# Patient Record
Sex: FEMALE | Race: BLACK OR AFRICAN AMERICAN | Employment: UNEMPLOYED | URBAN - METROPOLITAN AREA
[De-identification: names, ages, dates, MRNs, and addresses within clinical notes are randomized per-mention and may not be internally consistent; named-entity substitution may affect disease eponyms.]

---

## 2017-02-07 ENCOUNTER — TELEPHONE (OUTPATIENT)
Dept: FAMILY MEDICINE CLINIC | Age: 57
End: 2017-02-07

## 2017-02-20 ENCOUNTER — OFFICE VISIT (OUTPATIENT)
Dept: FAMILY MEDICINE CLINIC | Age: 57
End: 2017-02-20

## 2017-02-20 VITALS
OXYGEN SATURATION: 98 % | BODY MASS INDEX: 40.92 KG/M2 | TEMPERATURE: 97.6 F | DIASTOLIC BLOOD PRESSURE: 74 MMHG | SYSTOLIC BLOOD PRESSURE: 108 MMHG | HEIGHT: 68 IN | RESPIRATION RATE: 16 BRPM | HEART RATE: 72 BPM | WEIGHT: 270 LBS

## 2017-02-20 DIAGNOSIS — R35.0 FREQUENT URINATION: Primary | ICD-10-CM

## 2017-02-20 DIAGNOSIS — Z87.442 H/O RENAL CALCULI: ICD-10-CM

## 2017-02-20 DIAGNOSIS — R11.0 NAUSEA: ICD-10-CM

## 2017-02-20 DIAGNOSIS — R42 VERTIGO: ICD-10-CM

## 2017-02-20 DIAGNOSIS — R31.9 HEMATURIA: ICD-10-CM

## 2017-02-20 LAB
BILIRUB UR QL STRIP: NEGATIVE
GLUCOSE UR-MCNC: NEGATIVE MG/DL
KETONES P FAST UR STRIP-MCNC: NORMAL MG/DL
PH UR STRIP: 5.5 [PH] (ref 4.6–8)
PROT UR QL STRIP: NEGATIVE MG/DL
SP GR UR STRIP: 1.03 (ref 1–1.03)
UA UROBILINOGEN AMB POC: NORMAL (ref 0.2–1)
URINALYSIS CLARITY POC: CLEAR
URINALYSIS COLOR POC: YELLOW
URINE BLOOD POC: NORMAL
URINE LEUKOCYTES POC: NEGATIVE
URINE NITRITES POC: NEGATIVE

## 2017-02-20 RX ORDER — MECLIZINE HYDROCHLORIDE 25 MG/1
25 TABLET ORAL
Qty: 30 TAB | Refills: 2 | Status: SHIPPED | OUTPATIENT
Start: 2017-02-20 | End: 2017-03-02

## 2017-02-20 NOTE — PROGRESS NOTES
1. Have you been to the ER, urgent care clinic since your last visit? Hospitalized since your last visit? No    2. Have you seen or consulted any other health care providers outside of the [address] [place] [address] since your last visit? Include any pap smears or colon screening. No     Patient states she needs amitriptyline. She's taking tramadol- she has pain in back and hip. She states shes having frequent urination.

## 2017-02-20 NOTE — PROGRESS NOTES
Evan Sanchez is a 55 y.o.  female and presents with     Chief Complaint   Patient presents with    Nausea    Dizziness    Back Pain       Pt says she has nausea. Pt has back pain. Pt gives h/o kidney stones in the past.Pt has vertigo. Pt says she is falling apart. Pt takes metformin for obeisty and tramadol for back pain. Past Medical History   Diagnosis Date    Arthritis     Bipolar 1 disorder ([address]     Carpal tunnel syndrome      bother arms    Fibromyalgia     GERD (gastroesophageal reflux disease)     Hypercholesterolemia     IBS (irritable bowel syndrome)     Slipped cervical disc      Past Surgical History   Procedure Laterality Date    Hx cyst removal       Left wrist    Hx tubal ligation      Hx hysterectomy       uterus prolapse     Current Outpatient Prescriptions   Medication Sig    meclizine (ANTIVERT) 25 mg tablet Take 1 Tab by mouth three (3) times daily as needed for up to 10 days.  traMADol (ULTRAM) 50 mg tablet Take 1 Tab by mouth nightly as needed for Pain. Max Daily Amount: 50 mg.    traMADol (ULTRAM) 50 mg tablet Take 1 Tab by mouth every six (6) hours as needed for Pain. Max Daily Amount: 200 mg.    lubiPROStone (AMITIZA) 8 mcg capsule Take 1 Cap by mouth two (2) times daily (with meals).  lactulose (CHRONULAC) 10 gram/15 mL solution Take 15 mL by mouth nightly.  Omeprazole delayed release (PRILOSEC D/R) 20 mg tablet Take 1 Tab by mouth two (2) times a day.  omeprazole (PRILOSEC) 20 mg capsule Take 1 Cap by mouth two (2) times a day.  polyethylene glycol (MIRALAX) 17 gram/dose powder Take 17 g by mouth daily.  metFORMIN (GLUCOPHAGE) 500 mg tablet Take 1 Tab by mouth two (2) times daily (with meals). No current facility-administered medications for this visit.       Health Maintenance   Topic Date Due    BREAST CANCER SCRN MAMMOGRAM  12/16/2017    COLONOSCOPY  09/03/2018    PAP AKA CERVICAL CYTOLOGY  10/28/2019    DTaP/Tdap/Td series (2 - Td) 10/28/2026    Hepatitis C Screening  Completed    INFLUENZA AGE 9 TO ADULT  Completed     Immunization History   Administered Date(s) Administered    Influenza Vaccine (Quad) PF 10/28/2016    Tdap 10/28/2016     No LMP recorded. Patient has had a hysterectomy. Allergies and Intolerances: Allergies   Allergen Reactions    Biaxin [Clarithromycin] Rash    Codeine Itching    Morphine Shortness of Breath and Itching     burning    Nubain [Nalbuphine] Itching       Family History:   Family History   Problem Relation Age of Onset    Alcohol abuse Father        Social History:   She  reports that she has never smoked. She has never used smokeless tobacco.  She  reports that she does not drink alcohol.             Review of Systems: pos for nsytagmus  General: negative for - chills, fatigue, fever, weight change  Psych: negative for - anxiety, depression, irritability or mood swings  ENT: negative for - headaches, hearing change, nasal congestion, oral lesions, sneezing or sore throat  Heme/ Lymph: negative for - bleeding problems, bruising, pallor or swollen lymph nodes  Endo: negative for - hot flashes, polydipsia/polyuria or temperature intolerance  Resp: negative for - cough, shortness of breath or wheezing  CV: negative for - chest pain, edema or palpitations  GI: negative for - abdominal pain, change in bowel habits, constipation, diarrhea or nausea/vomiting,pos for nausea  : negative for - dysuria, hematuria, incontinence, pelvic pain or vulvar/vaginal symptoms  MSK: negative for - joint pain, joint swelling or muscle pain  Neuro: negative for - confusion, headaches, seizures or weakness  Derm: negative for - dry skin, hair changes, rash or skin lesion changes          Physical:   Vitals:   Vitals:    02/20/17 1148   BP: 108/74   Pulse: 72   Resp: 16   Temp: 97.6 °F (36.4 °C)   TempSrc: Oral   SpO2: 98%   Weight: 270 lb (122.5 kg)   Height: 5' 8\" (1.727 m)           Exam:   HEENT- atraumatic,normocephalic, awake, oriented, well nourished, nystagmus ++ horizontal  Neck - supple,no enlarged lymph nodes, no JVD, no thyromegaly  Chest- CTA, no rhonchi, no crackles  Heart- rrr, no murmurs / gallop/rub  Abdomen- soft,BS+,NT, no hepatosplenomegaly  Ext - no c/c/edema   Neuro- no focal deficits. Power 5/5 all extremities  Skin - warm,dry, no obvious rashes. Review of Data:   LABS:   Lab Results   Component Value Date/Time    WBC 8.9 12/06/2016 12:00 AM    HGB 12.1 12/06/2016 12:00 AM    HCT 35.9 12/06/2016 12:00 AM    PLATELET 052 96/91/1429 12:00 AM     Lab Results   Component Value Date/Time    Sodium 142 12/06/2016 12:00 AM    Potassium 4.2 12/06/2016 12:00 AM    Chloride 101 12/06/2016 12:00 AM    CO2 24 12/06/2016 12:00 AM    Glucose 106 12/06/2016 12:00 AM    BUN 12 12/06/2016 12:00 AM    Creatinine 0.93 12/06/2016 12:00 AM     Lab Results   Component Value Date/Time    Cholesterol, total 203 10/28/2016 11:42 AM    HDL Cholesterol 52 10/28/2016 11:42 AM    LDL, calculated 123 10/28/2016 11:42 AM    Triglyceride 140 10/28/2016 11:42 AM     No results found for: GPT        Impression / Plan:        ICD-10-CM ICD-9-CM    1. Frequent urination R35.0 788.41 AMB POC URINALYSIS DIP STICK AUTO W/O MICRO   2. Vertigo R42 780.4 meclizine (ANTIVERT) 25 mg tablet      REFERRAL TO ENT-OTOLARYNGOLOGY   3. Nausea R11.0 787.02 REFERRAL TO GASTROENTEROLOGY   4. Hematuria R31.9 599.70 CT ABD PELV W WO CONT   5. H/O renal calculi Z87.442 V13.01 CT ABD PELV W WO CONT     Asked pt to hold metformin . Take tramadol with food. Explained to patient risk benefits of the medications. Advised patient to stop meds if having any side effects. Pt verbalized understanding of the instructions. I have discussed the diagnosis with the patient and the intended plan as seen in the above orders. The patient has received an after-visit summary and questions were answered concerning future plans.   I have discussed medication side effects and warnings with the patient as well. I have reviewed the plan of care with the patient, accepted their input and they are in agreement with the treatment goals. Reviewed plan of care. Patient has provided input and agrees with goals.     Follow-up Disposition: Not on Daisy Enrique MD

## 2017-02-20 NOTE — MR AVS SNAPSHOT
Visit Information Date & Time Provider Department Dept. Phone Encounter #  
 2/20/2017 11:30 AM Yecenia Henderson, [address] [facility] [phone] [id number] Follow-up Instructions Return in about 6 weeks (around 4/3/2017). Upcoming Health Maintenance Date Due  
 BREAST CANCER SCRN MAMMOGRAM 12/16/2017 COLONOSCOPY 9/3/2018 PAP AKA CERVICAL CYTOLOGY 10/28/2019 DTaP/Tdap/Td series (2 - Td) 10/28/2026 Allergies as of 2/20/2017  Review Complete On: 2/20/2017 By: Lissy Thompson LPN Severity Noted Reaction Type Reactions Biaxin [Clarithromycin]  02/22/2016    Rash Codeine  07/29/2013    Itching Morphine  07/29/2013    Shortness of Breath, Itching  
 burning Nubain [Nalbuphine]  07/29/2013    Itching Current Immunizations  Never Reviewed Name Date Influenza Vaccine (Quad) PF 10/28/2016 11:20 AM  
 Tdap 10/28/2016 11:20 AM  
  
 Not reviewed this visit You Were Diagnosed With   
  
 Codes Comments Frequent urination    -  Primary ICD-10-CM: R35.0 ICD-9-CM: 788.41 Vertigo     ICD-10-CM: F48 ICD-9-CM: 780.4 Nausea     ICD-10-CM: R11.0 ICD-9-CM: 787.02 Hematuria     ICD-10-CM: R31.9 ICD-9-CM: 599.70 H/O renal calculi     ICD-10-CM: Z87.442 ICD-9-CM: V13.01 Vitals BP Pulse Temp Resp Height(growth percentile) Weight(growth percentile) 108/74 (BP 1 Location: Left arm, BP Patient Position: Sitting) 72 97.6 °F (36.4 °C) (Oral) 16 5' 8\" (1.727 m) 270 lb (122.5 kg) SpO2 BMI OB Status Smoking Status 98% 41.05 kg/m2 Hysterectomy Never Smoker Vitals History BMI and BSA Data Body Mass Index Body Surface Area 41.05 kg/m 2 2.42 m 2 Preferred Pharmacy Pharmacy Name Phone [id number] [address], [address] [place] [address] [phone] Your Updated Medication List  
  
   
This list is accurate as of: 2/20/17 12:51 PM.  Always use your most recent med list.  
  
  
  
  
 lactulose 10 gram/15 mL solution Commonly known as:  [place] Take 15 mL by mouth nightly. lubiPROStone 8 mcg capsule Commonly known as:  [place] Force Take 1 Cap by mouth two (2) times daily (with meals). meclizine 25 mg tablet Commonly known as:  ANTIVERT Take 1 Tab by mouth three (3) times daily as needed for up to 10 days. metFORMIN 500 mg tablet Commonly known as:  GLUCOPHAGE Take 1 Tab by mouth two (2) times daily (with meals). * Omeprazole delayed release 20 mg tablet Commonly known as:  PRILOSEC D/R Take 1 Tab by mouth two (2) times a day. * omeprazole 20 mg capsule Commonly known as:  PriLOSEC Take 1 Cap by mouth two (2) times a day. polyethylene glycol 17 gram/dose powder Commonly known as:  Nat Diane Take 17 g by mouth daily. * traMADol 50 mg tablet Commonly known as:  ULTRAM  
Take 1 Tab by mouth every six (6) hours as needed for Pain. Max Daily Amount: 200 mg.  
  
 * traMADol 50 mg tablet Commonly known as:  ULTRAM  
Take 1 Tab by mouth nightly as needed for Pain. Max Daily Amount: 50 mg.  
  
 * Notice: This list has 4 medication(s) that are the same as other medications prescribed for you. Read the directions carefully, and ask your doctor or other care provider to review them with you. Prescriptions Sent to Pharmacy Refills  
 meclizine (ANTIVERT) 25 mg tablet 2 Sig: Take 1 Tab by mouth three (3) times daily as needed for up to 10 days. Class: Normal  
 [facility] [address] #: [phone] Route: Oral  
  
We Performed the Following AMB POC URINALYSIS DIP STICK AUTO W/O MICRO [85075 CPT(R)] REFERRAL TO ENT-OTOLARYNGOLOGY [NNP90 Custom] REFERRAL TO GASTROENTEROLOGY [ZLW53 Custom] Follow-up Instructions Return in about 6 weeks (around 4/3/2017). To-Do List   
 02/20/2017 Imaging:  CT ABD PELV W WO CONT Referral Information Referral ID Referred By Referred To  
  
 [id number] [facility] [place], Isaura Hernandez., MD [address] Phone: [phone] Fax: [phone] Visits Status Start Date End Date 1 New Request 2/20/17 2/20/18 If your referral has a status of pending review or denied, additional information will be sent to support the outcome of this decision. Referral ID Referred By Referred To  
 [id number] Samina GILES Not Available Visits Status Start Date End Date 1 New Request 2/20/17 2/20/18 If your referral has a status of pending review or denied, additional information will be sent to support the outcome of this decision. Referral ID Referred By Referred To  
 [id number] Samina GILES Not Available Visits Status Start Date End Date 1 New Request 2/20/17 2/20/18 If your referral has a status of pending review or denied, additional information will be sent to support the outcome of this decision. Introducing [facility] [place] [facility]! Zacarias Sanchez introduces [employer] patient portal. Now you can access parts of your medical record, email your doctor's office, and request medication refills online. 1. In your internet browser, go to [email or web address] 2. Click on the First Time User? Click Here link in the Sign In box. You will see the New Member Sign Up page. 3. Enter your [employer] Access Code exactly as it appears below. You will not need to use this code after youve completed the sign-up process. If you do not sign up before the expiration date, you must request a new code. · [employer] Access Code: [email or web address] Expires: 5/9/2017  2:08 PM 
 
4. Enter the last four digits of your Social Security Number (xxxx) and Date of Birth (mm/dd/yyyy) as indicated and click Submit. You will be taken to the next sign-up page. 5. Create a Con[employer] ID. This will be your [employer] login ID and cannot be changed, so think of one that is secure and easy to remember. 6. Create a [employer] password. You can change your password at any time. 7. Enter your Password Reset Question and Answer. This can be used at a later time if you forget your password. 8. Enter your e-mail address. You will receive e-mail notification when new information is available in [address] 9. Click Sign Up. You can now view and download portions of your medical record. 10. Click the Download Summary menu link to download a portable copy of your medical information. If you have questions, please visit the Frequently Asked Questions section of the [employer] website. Remember, [employer] is NOT to be used for urgent needs. For medical emergencies, dial 911. Now available from your iPhone and Android! Please provide this summary of care documentation to your next provider. Your primary care clinician is listed as [phone]. If you have any questions after today's visit, please call [phone].

## 2017-03-10 ENCOUNTER — HOSPITAL ENCOUNTER (OUTPATIENT)
Dept: CT IMAGING | Age: 57
Discharge: HOME | End: 2017-03-10
Attending: INTERNAL MEDICINE
Payer: MEDICAID

## 2017-03-10 DIAGNOSIS — R31.9 HEMATURIA: ICD-10-CM

## 2017-03-10 DIAGNOSIS — Z87.442 H/O RENAL CALCULI: ICD-10-CM

## 2017-03-10 PROCEDURE — 74176 CT ABD & PELVIS W/O CONTRAST: CPT

## 2017-04-06 RX ORDER — LUBIPROSTONE 8 UG/1
8 CAPSULE, GELATIN COATED ORAL 2 TIMES DAILY WITH MEALS
COMMUNITY
End: 2017-10-17

## 2017-04-12 ENCOUNTER — TELEPHONE (OUTPATIENT)
Dept: FAMILY MEDICINE CLINIC | Age: 57
End: 2017-04-12

## 2017-04-12 NOTE — TELEPHONE ENCOUNTER
Pt called wanting to know if she could get her lab results without being seen by Dr. Donato Tabor.  Please assist.

## 2017-04-14 ENCOUNTER — ANESTHESIA EVENT (OUTPATIENT)
Dept: ENDOSCOPY | Age: 57
End: 2017-04-14
Payer: MEDICAID

## 2017-04-14 NOTE — TELEPHONE ENCOUNTER
CTscan does not show zoe kidney stones. Pt had h/o kidney stones in the past , Recent CT scan is normal, so that is good.

## 2017-04-17 ENCOUNTER — ANESTHESIA (OUTPATIENT)
Dept: ENDOSCOPY | Age: 57
End: 2017-04-17
Payer: MEDICAID

## 2017-04-17 ENCOUNTER — HOSPITAL ENCOUNTER (OUTPATIENT)
Age: 57
Setting detail: OUTPATIENT SURGERY
Discharge: HOME | End: 2017-04-17
Attending: INTERNAL MEDICINE | Admitting: INTERNAL MEDICINE
Payer: MEDICAID

## 2017-04-17 VITALS
OXYGEN SATURATION: 99 % | BODY MASS INDEX: 38.19 KG/M2 | SYSTOLIC BLOOD PRESSURE: 113 MMHG | TEMPERATURE: 97.2 F | WEIGHT: 252 LBS | RESPIRATION RATE: 16 BRPM | DIASTOLIC BLOOD PRESSURE: 56 MMHG | HEIGHT: 68 IN | HEART RATE: 85 BPM

## 2017-04-17 PROCEDURE — 74011250636 HC RX REV CODE- 250/636

## 2017-04-17 PROCEDURE — 76040000007: Performed by: INTERNAL MEDICINE

## 2017-04-17 PROCEDURE — 88305 TISSUE EXAM BY PATHOLOGIST: CPT | Performed by: INTERNAL MEDICINE

## 2017-04-17 PROCEDURE — 76060000032 HC ANESTHESIA 0.5 TO 1 HR: Performed by: INTERNAL MEDICINE

## 2017-04-17 PROCEDURE — 88342 IMHCHEM/IMCYTCHM 1ST ANTB: CPT | Performed by: INTERNAL MEDICINE

## 2017-04-17 PROCEDURE — 74011250636 HC RX REV CODE- 250/636: Performed by: NURSE ANESTHETIST, CERTIFIED REGISTERED

## 2017-04-17 RX ORDER — ONDANSETRON 2 MG/ML
INJECTION INTRAMUSCULAR; INTRAVENOUS AS NEEDED
Status: DISCONTINUED | OUTPATIENT
Start: 2017-04-17 | End: 2017-04-17 | Stop reason: HOSPADM

## 2017-04-17 RX ORDER — PROPOFOL 10 MG/ML
INJECTION, EMULSION INTRAVENOUS AS NEEDED
Status: DISCONTINUED | OUTPATIENT
Start: 2017-04-17 | End: 2017-04-17 | Stop reason: HOSPADM

## 2017-04-17 RX ORDER — DEXAMETHASONE SODIUM PHOSPHATE 4 MG/ML
INJECTION, SOLUTION INTRA-ARTICULAR; INTRALESIONAL; INTRAMUSCULAR; INTRAVENOUS; SOFT TISSUE AS NEEDED
Status: DISCONTINUED | OUTPATIENT
Start: 2017-04-17 | End: 2017-04-17 | Stop reason: HOSPADM

## 2017-04-17 RX ORDER — FAMOTIDINE 20 MG/1
20 TABLET, FILM COATED ORAL ONCE
Status: DISCONTINUED | OUTPATIENT
Start: 2017-04-17 | End: 2017-04-17

## 2017-04-17 RX ORDER — SODIUM CHLORIDE 0.9 % (FLUSH) 0.9 %
5-10 SYRINGE (ML) INJECTION EVERY 8 HOURS
Status: DISCONTINUED | OUTPATIENT
Start: 2017-04-17 | End: 2017-04-17 | Stop reason: HOSPADM

## 2017-04-17 RX ORDER — SODIUM CHLORIDE 0.9 % (FLUSH) 0.9 %
5-10 SYRINGE (ML) INJECTION AS NEEDED
Status: DISCONTINUED | OUTPATIENT
Start: 2017-04-17 | End: 2017-04-17 | Stop reason: HOSPADM

## 2017-04-17 RX ORDER — LIDOCAINE HYDROCHLORIDE 10 MG/ML
0.1 INJECTION, SOLUTION EPIDURAL; INFILTRATION; INTRACAUDAL; PERINEURAL AS NEEDED
Status: DISCONTINUED | OUTPATIENT
Start: 2017-04-17 | End: 2017-04-17 | Stop reason: HOSPADM

## 2017-04-17 RX ORDER — DEXTROMETHORPHAN/PSEUDOEPHED 2.5-7.5/.8
1.2 DROPS ORAL
Status: DISCONTINUED | OUTPATIENT
Start: 2017-04-17 | End: 2017-04-17 | Stop reason: HOSPADM

## 2017-04-17 RX ORDER — PROPOFOL 10 MG/ML
INJECTION, EMULSION INTRAVENOUS
Status: DISCONTINUED | OUTPATIENT
Start: 2017-04-17 | End: 2017-04-17 | Stop reason: HOSPADM

## 2017-04-17 RX ORDER — SODIUM CHLORIDE, SODIUM LACTATE, POTASSIUM CHLORIDE, CALCIUM CHLORIDE 600; 310; 30; 20 MG/100ML; MG/100ML; MG/100ML; MG/100ML
25 INJECTION, SOLUTION INTRAVENOUS CONTINUOUS
Status: DISCONTINUED | OUTPATIENT
Start: 2017-04-17 | End: 2017-04-17 | Stop reason: HOSPADM

## 2017-04-17 RX ADMIN — ONDANSETRON 4 MG: 2 INJECTION INTRAMUSCULAR; INTRAVENOUS at 11:28

## 2017-04-17 RX ADMIN — SODIUM CHLORIDE, SODIUM LACTATE, POTASSIUM CHLORIDE, AND CALCIUM CHLORIDE 25 ML/HR: 600; 310; 30; 20 INJECTION, SOLUTION INTRAVENOUS at 11:14

## 2017-04-17 RX ADMIN — DEXAMETHASONE SODIUM PHOSPHATE 4 MG: 4 INJECTION, SOLUTION INTRA-ARTICULAR; INTRALESIONAL; INTRAMUSCULAR; INTRAVENOUS; SOFT TISSUE at 11:28

## 2017-04-17 RX ADMIN — PROPOFOL 100 MG: 10 INJECTION, EMULSION INTRAVENOUS at 11:40

## 2017-04-17 RX ADMIN — PROPOFOL 140 MCG/KG/MIN: 10 INJECTION, EMULSION INTRAVENOUS at 11:40

## 2017-04-17 NOTE — IP AVS SNAPSHOT
Olga Gonzalez 
 
 
 [address] 
[phone] Patient: Katherine Bridges MRN: [id number] You are allergic to the following Allergen Reactions Biaxin (Clarithromycin) Rash Codeine Itching Morphine Shortness of Breath Itching  
 burning Nubain (Nalbuphine) Itching Recent Documentation Height Weight Breastfeeding? BMI OB Status Smoking Status 1.727 m 114.3 kg No 38.32 kg/m2 Hysterectomy Never Smoker Emergency Contacts Name Discharge Info Relation Home Work Mobile Chepe Blade [5]   [phone] Crowder,Lakia DISCHARGE CAREGIVER [3] Child [2] [phone] About your hospitalization You were admitted on:  April 17, 2017 [facility] You were discharged on:  April 17, 2017 Unit phone number:  [phone] Why you were hospitalized Your primary diagnosis was:  Not on File Providers Seen During Your Hospitalizations Provider Role Specialty Primary office phone Marlene Lara MD Attending Provider Gastroenterology [phone] Your Primary Care Physician (PCP) Primary Care Physician Office Phone Office Fax [facility] [phone] Follow-up Information Follow up With Details Comments Contact Info Ozzie Casanova MD   [address] [place] [address] [phone] Your Appointments Thursday April 20, 2017 10:30 AM EDT Follow Up with Ozzie Casanova MD  
[facility] [address] [place] [address] [id number] [place] [address] [address] [place] [address] [phone] Current Discharge Medication List  
  
CONTINUE these medications which have NOT CHANGED Dose & Instructions Dispensing Information Comments Morning Noon Evening Bedtime AMITIZA 8 mcg capsule Generic drug:  lubiPROStone Your last dose was: Your next dose is:    
   
   
 Dose:  8 mcg Take 8 mcg by mouth two (2) times daily (with meals). Refills:  0  
     
   
   
   
  
 metFORMIN 500 mg tablet Commonly known as:  GLUCOPHAGE Your last dose was: Your next dose is:    
   
   
 Dose:  500 mg Take 1 Tab by mouth two (2) times daily (with meals). Quantity:  60 Tab Refills:  3  
     
   
   
   
  
 omeprazole 20 mg capsule Commonly known as:  PriLOSEC Your last dose was: Your next dose is:    
   
   
 Dose:  20 mg Take 1 Cap by mouth two (2) times a day. Quantity:  60 Cap Refills:  3  
     
   
   
   
  
 traMADol 50 mg tablet Commonly known as:  ULTRAM  
   
Your last dose was: Your next dose is:    
   
   
 Dose:  50 mg Take 1 Tab by mouth every six (6) hours as needed for Pain. Max Daily Amount: 200 mg. Quantity:  50 Tab Refills:  0 Discharge Instructions Upper GI Endoscopy: What to Expect at [facility] Your Recovery After you have an endoscopy, you will stay at the hospital or clinic for 1 to 2 hours. This will allow the medicine to wear off. You will be able to go home after your doctor or nurse checks to make sure you are not having any problems. You may have to stay overnight if you had treatment during the test. You may have a sore throat for a day or two after the test. 
This care sheet gives you a general idea about what to expect after the test. 
How can you care for yourself at home? Activity · Rest as much as you need to after you go home. · You should be able to go back to your usual activities the day after the test. 
Diet · Follow your doctor's directions for eating after the test. 
· Drink plenty of fluids (unless your doctor has told you not to). Medications · If you have a sore throat the day after the test, use an over-the-counter spray to numb your throat. Follow-up care is a key part of your treatment and safety. Be sure to make and go to all appointments, and call your doctor if you are having problems. It's also a good idea to know your test results and keep a list of the medicines you take. When should you call for help? Call 911 anytime you think you may need emergency care. For example, call if: 
· You passed out (lost consciousness). · You cough up blood. · You vomit blood or what looks like coffee grounds. · You pass maroon or very bloody stools. Call your doctor now or seek immediate medical care if: 
· You have trouble swallowing. · You have belly pain. · Your stools are black and tarlike or have streaks of blood. · You are sick to your stomach or cannot keep fluids down. Watch closely for changes in your health, and be sure to contact your doctor if: 
· Your throat still hurts after a day or two. · You do not get better as expected. Where can you learn more? Go to http://osmar-jaziel.info/. Enter [phone] [phone] in the search box to learn more about \"Upper GI Endoscopy: What to Expect at Home. \" Current as of: August 9, 2016 Content Version: 11.2 © 0558-8988 [employer], [employer]. Care instructions adapted under license by [employer] (which disclaims liability or warranty for this information). If you have questions about a medical condition or this instruction, always ask your healthcare professional. [place] [address] any warranty or liability for your use of this information. DISCHARGE SUMMARY from Nurse The following personal items are in your possession at time of discharge: 
 
Dental Appliances: None Visual Aid: Glasses PATIENT INSTRUCTIONS: 
 
 
Recognize signs and symptoms of STROKE: 
 F-face looks uneven A-arms unable to move or move unevenly S-speech slurred or non-existent T-time-call 911 as soon as signs and symptoms begin-DO NOT go Back to bed or wait to see if you get better-TIME IS BRAIN. Warning Signs of HEART ATTACK Call 911 if you have these symptoms: 
? Chest discomfort. Most heart attacks involve discomfort in the center of the chest that lasts more than a few minutes, or that goes away and comes back. It can feel like uncomfortable pressure, squeezing, fullness, or pain. ? Discomfort in other areas of the upper body. Symptoms can include pain or discomfort in one or both arms, the back, neck, jaw, or stomach. ? Shortness of breath with or without chest discomfort. ? Other signs may include breaking out in a cold sweat, nausea, or lightheadedness. Don't wait more than five minutes to call [address]! Fast action can save your life. Calling 911 is almost always the fastest way to get lifesaving treatment. Emergency Medical Services staff can begin treatment when they arrive  up to an hour sooner than if someone gets to the hospital by car. The discharge information has been reviewed with the {PATIENT PARENT GUARDIAN:22961}. The {PATIENT PARENT GUARDIAN:29679} verbalized understanding. Discharge medications reviewed with the {Dishcarge meds reviewed ECUR:02818} and appropriate educational materials and side effects teaching were provided. Patient {ARMBANDS:20124} Discharge Orders None Introducing [facility] [place] [facility] SERVICES! Humaira Potter introduces [employer] patient portal. Now you can access parts of your medical record, email your doctor's office, and request medication refills online. 1. In your internet browser, go to [email or web address] 2. Click on the First Time User? Click Here link in the Sign In box. You will see the New Member Sign Up page. 3. Enter your enModus Access Code exactly as it appears below. You will not need to use this code after youve completed the sign-up process. If you do not sign up before the expiration date, you must request a new code. · [employer] Access Code: [email or web address] Expires: 5/9/2017  3:08 PM 
 
4. Enter the last four digits of your Social Security Number (xxxx) and Date of Birth (mm/dd/yyyy) as indicated and click Submit. You will be taken to the next sign-up page. 5. Create a enModus ID. This will be your [employer] login ID and cannot be changed, so think of one that is secure and easy to remember. 6. Create a [employer] password. You can change your password at any time. 7. Enter your Password Reset Question and Answer. This can be used at a later time if you forget your password. 8. Enter your e-mail address. You will receive e-mail notification when new information is available in [address] 9. Click Sign Up. You can now view and download portions of your medical record. 10. Click the Download Summary menu link to download a portable copy of your medical information. If you have questions, please visit the Frequently Asked Questions section of the [employer]us website. Remember, [employer] is NOT to be used for urgent needs. For medical emergencies, dial 911. Now available from your iPhone and Android! General Information Please provide this summary of care documentation to your next provider. Patient Signature:  ____________________________________________________________ Date:  ____________________________________________________________  
  
[facility] Provider Signature:  ____________________________________________________________ Date:  ____________________________________________________________

## 2017-04-17 NOTE — H&P
Date of Surgery Update:  Tereso Cleveland was seen and examined. History and physical has been reviewed. The patient has been examined.  There have been no significant clinical changes since the completion of the originally dated History and Physical.    Signed By: Leigha Vanegas MD     April 17, 2017 10:03 AM

## 2017-04-17 NOTE — PROCEDURES
Endoscopy Procedure Note    Patient: Tereso Cleveland MRN: [id number]  SSN: [id number]    YOB: 1960  Age: 55 y.o. Sex: female      Date/Time:  4/17/2017 11:56 AM    Esophagogastroduodenoscopy (EGD) Procedure Note    Procedure: Esophagogastroduodenoscopy with biopsy    IMPRESSION:   1. -2 cm hiatal hernia. 2. -1 cm nodule in duodenal bulb -bx'd.  3. - Otherwise normal EGD    RECOMMENDATIONS:  1. -Continue acid suppression. For Gerd  2. -Await pathology. , -Follow up with primary care physician  3. -Schedule outpt  RUQ ultrasound  and GES    Indication: Abdominal pain, epigastric, Nausea  :  Leigha Vanegas MD  Referring Provider:   Deanne Angelucci, MD  History: The history and physical exam were reviewed and updated. Endoscope: [id number]  Extent of Exam: third portion of the duodenum  ASA: ASA 2 - Patient with mild systemic disease with no functional limitations  Anethesia/Sedation:  MAC anesthesia    Description of the procedure: The procedure was discussed with the patient including risks, benefits, alternatives including risks of iv sedation, bleeding, perforation and aspiration. A safety timeout was performed. The patient was placed in the left lateral decubitus position. A bite block was placed. The patient was given incremental doses of intravenous sedation until moderate sedation was achieved. The patients vital signs were monitored at all times including heart rate/rhythm, blood pressure and oxygen saturation. The endoscope was then passed under direct visualization to the third portion of the duodenum. The endoscope was then slowly withdrawn while visualizing the mucosa. In the stomach a retroflexion was performed and gastric fundus and cardia visualized. The endoscope was then slowly withdrawn. The patient was then transferred to recovery in stable condition. Findings:    Esophagus: The esophageal mucosa was normal with no ulceration, mass or stricture. There was no evidence of Thomas's esophagus or reflux esophagitis. Stomach: The gastric mucosa was normal with no ulceration, mass, stricture. Duodenum: The duodenum mucosa was normal with no ulceration, mass,   stricture and no evidence of villous atrophy. Therapies:  biopsy of stomach antrum  biopsy of duodenal proximal bulb, second portion    Specimens:   ID Type Source Tests Collected by Time Destination   1 : Bx polyp deuodenal bulb Preservative Duodenum  Joe Rodriguez MD 4/17/2017 1143 Pathology   2 : Bx Duodenum r/o celiac Preservative Duodenum  Joe Rodriguez MD 4/17/2017 1146 Pathology   3 : Bx Antrum for H Pylori Preservative Stomach, Antrum  Joe Rodriguez MD 4/17/2017 1148 Pathology               Complications:   None; patient tolerated the procedure well.     EBL:Minimal    Discharge disposition:  Home in the company of  when able to ambulate    Joe Rodriguez MD, SHANAE Alvarenga  April 17, 2017  11:56 AM

## 2017-04-17 NOTE — ANESTHESIA POSTPROCEDURE EVALUATION
Post-Anesthesia Evaluation and Assessment    Patient: Vanessa Marcano MRN: [id number]  SSN: [id number]    YOB: 1960  Age: 55 y.o. Sex: female       Cardiovascular Function/Vital Signs  Visit Vitals    BP 98/66 (BP 1 Location: Left arm, BP Patient Position: At rest)    Pulse 83    Temp 36.2 °C (97.2 °F)    Resp 16    Ht 5' 8\" (1.727 m)    Wt 114.3 kg (252 lb)    SpO2 98%    Breastfeeding No    BMI 38.32 kg/m2       Patient is status post MAC anesthesia for Procedure(s):  ESOPHAGOGASTRODUODENOSCOPY (EGD). Nausea/Vomiting: None    Postoperative hydration reviewed and adequate. Pain:  Pain Scale 1: Numeric (0 - 10) (04/17/17 1052)  Pain Intensity 1: 0 (04/17/17 1052)   Managed    Neurological Status: At baseline    Mental Status and Level of Consciousness: Arousable    Pulmonary Status:   O2 Device: Room air (04/17/17 1059)   Adequate oxygenation and airway patent    Complications related to anesthesia: None    Post-anesthesia assessment completed.  No concerns          Signed By: Yonis Lang MD     April 17, 2017

## 2017-04-17 NOTE — ANESTHESIA POSTPROCEDURE EVALUATION
Post-Anesthesia Evaluation and Assessment    Patient: Shelbie Zimmerman MRN: [id number]  SSN: [id number]    YOB: 1960  Age: 55 y.o. Sex: female       Cardiovascular Function/Vital Signs  Visit Vitals    BP 98/66 (BP 1 Location: Left arm, BP Patient Position: At rest)    Pulse 83    Temp 36.2 °C (97.2 °F)    Resp 16    Ht 5' 8\" (1.727 m)    Wt 114.3 kg (252 lb)    SpO2 98%    Breastfeeding No    BMI 38.32 kg/m2       Patient is status post MAC anesthesia for Procedure(s):  ESOPHAGOGASTRODUODENOSCOPY (EGD). Nausea/Vomiting: None    Postoperative hydration reviewed and adequate. Pain:  Pain Scale 1: Numeric (0 - 10) (04/17/17 1052)  Pain Intensity 1: 0 (04/17/17 1052)   Managed    Neurological Status: At baseline    Mental Status and Level of Consciousness: Alert and oriented     Pulmonary Status:   O2 Device: Room air (04/17/17 1157)   Adequate oxygenation and airway patent    Complications related to anesthesia: None    Post-anesthesia assessment completed.  No concerns    Signed By: Evert Joshua CRNA     April 17, 2017

## 2017-04-17 NOTE — ANESTHESIA PREPROCEDURE EVALUATION
Anesthetic History   No history of anesthetic complications            Review of Systems / Medical History  Patient summary reviewed and pertinent labs reviewed    Pulmonary        Sleep apnea: CPAP           Neuro/Psych         Psychiatric history     Cardiovascular                  Exercise tolerance: >4 METS     GI/Hepatic/Renal     GERD      Liver disease     Endo/Other        Arthritis     Other Findings   Comments:   Risk Factors for Postoperative nausea/vomiting:       History of postoperative nausea/vomiting? NO       Female? YES       Motion sickness? NO       Intended opioid administration for postoperative analgesia?   NO           Physical Exam    Airway  Mallampati: I  TM Distance: > 6 cm  Neck ROM: normal range of motion   Mouth opening: Normal     Cardiovascular  Regular rate and rhythm,  S1 and S2 normal,  no murmur, click, rub, or gallop             Dental  No notable dental hx       Pulmonary  Breath sounds clear to auscultation               Abdominal  GI exam deferred       Other Findings            Anesthetic Plan    ASA: 3  Anesthesia type: MAC          Induction: Intravenous  Anesthetic plan and risks discussed with: Patient

## 2017-04-17 NOTE — DISCHARGE INSTRUCTIONS
Upper GI Endoscopy: What to Expect at [address] [place] [address]  After you have an endoscopy, you will stay at the hospital or clinic for 1 to 2 hours. This will allow the medicine to wear off. You will be able to go home after your doctor or nurse checks to make sure you are not having any problems. You may have to stay overnight if you had treatment during the test. You may have a sore throat for a day or two after the test.  This care sheet gives you a general idea about what to expect after the test.  How can you care for yourself at home? Activity  · Rest as much as you need to after you go home. · You should be able to go back to your usual activities the day after the test.  Diet  · Follow your doctor's directions for eating after the test.  · Drink plenty of fluids (unless your doctor has told you not to). Medications  · If you have a sore throat the day after the test, use an over-the-counter spray to numb your throat. Follow-up care is a key part of your treatment and safety. Be sure to make and go to all appointments, and call your doctor if you are having problems. It's also a good idea to know your test results and keep a list of the medicines you take. When should you call for help? Call 911 anytime you think you may need emergency care. For example, call if:  · You passed out (lost consciousness). · You cough up blood. · You vomit blood or what looks like coffee grounds. · You pass maroon or very bloody stools. Call your doctor now or seek immediate medical care if:  · You have trouble swallowing. · You have belly pain. · Your stools are black and tarlike or have streaks of blood. · You are sick to your stomach or cannot keep fluids down. Watch closely for changes in your health, and be sure to contact your doctor if:  · Your throat still hurts after a day or two. · You do not get better as expected. Where can you learn more? Go to http://yury.info/.   Enter J454 in the search box to learn more about \"Upper GI Endoscopy: What to Expect at Home. \"  Current as of: August 9, 2016  Content Version: 11.2  © 2867-5690 [employer]. Care instructions adapted under license by [employer] (which disclaims liability or warranty for this information). If you have questions about a medical condition or this instruction, always ask your healthcare professional. [place] [address] any warranty or liability for your use of this information. DISCHARGE SUMMARY from Nurse    The following personal items are in your possession at time of discharge:    Dental Appliances: None  Visual Aid: Glasses                            PATIENT INSTRUCTIONS:    After general anesthesia or intravenous sedation, for 24 hours or while taking prescription Narcotics:  · Limit your activities  · Do not drive and operate hazardous machinery  · Do not make important personal or business decisions  · Do  not drink alcoholic beverages  · If you have not urinated within 8 hours after discharge, please contact your surgeon on call. Report the following to your surgeon:  · Excessive pain, swelling, redness or odor of or around the surgical area  · Temperature over 100.5  · Nausea and vomiting lasting longer than 4 hours or if unable to take medications  · Any signs of decreased circulation or nerve impairment to extremity: change in color, persistent  numbness, tingling, coldness or increase pain  · Any questions        What to do at Home:    These are general instructions for a healthy lifestyle:    No smoking/ No tobacco products/ Avoid exposure to second hand smoke    Surgeon General's Warning:  Quitting smoking now greatly reduces serious risk to your health.     Obesity, smoking, and sedentary lifestyle greatly increases your risk for illness    A healthy diet, regular physical exercise & weight monitoring are important for maintaining a healthy lifestyle    You may be retaining fluid if you have a history of heart failure or if you experience any of the following symptoms:  Weight gain of 3 pounds or more overnight or 5 pounds in a week, increased swelling in our hands or feet or shortness of breath while lying flat in bed. Please call your doctor as soon as you notice any of these symptoms; do not wait until your next office visit. Recognize signs and symptoms of STROKE:    F-face looks uneven    A-arms unable to move or move unevenly    S-speech slurred or non-existent    T-time-call 911 as soon as signs and symptoms begin-DO NOT go       Back to bed or wait to see if you get better-TIME IS BRAIN. Warning Signs of HEART ATTACK     Call 911 if you have these symptoms:   Chest discomfort. Most heart attacks involve discomfort in the center of the chest that lasts more than a few minutes, or that goes away and comes back. It can feel like uncomfortable pressure, squeezing, fullness, or pain.  Discomfort in other areas of the upper body. Symptoms can include pain or discomfort in one or both arms, the back, neck, jaw, or stomach.  Shortness of breath with or without chest discomfort.  Other signs may include breaking out in a cold sweat, nausea, or lightheadedness. Don't wait more than five minutes to call 911 - MINUTES MATTER! Fast action can save your life. Calling 911 is almost always the fastest way to get lifesaving treatment. Emergency Medical Services staff can begin treatment when they arrive -- up to an hour sooner than if someone gets to the hospital by car. The discharge information has been reviewed with the patient. The patient verbalized understanding. Discharge medications reviewed with the patient and appropriate educational materials and side effects teaching were provided. Patient armband removed and given to patient to take home.   Patient was informed of the privacy risks if armband lost or stolen

## 2017-04-25 ENCOUNTER — DOCUMENTATION ONLY (OUTPATIENT)
Dept: FAMILY MEDICINE CLINIC | Age: 57
End: 2017-04-25

## 2017-06-27 ENCOUNTER — OFFICE VISIT (OUTPATIENT)
Dept: OBGYN CLINIC | Age: 57
End: 2017-06-27

## 2017-06-27 ENCOUNTER — HOSPITAL ENCOUNTER (OUTPATIENT)
Dept: LAB | Age: 57
Discharge: HOME | End: 2017-06-27

## 2017-06-27 VITALS
BODY MASS INDEX: 37.59 KG/M2 | RESPIRATION RATE: 18 BRPM | SYSTOLIC BLOOD PRESSURE: 113 MMHG | HEART RATE: 68 BPM | HEIGHT: 68 IN | DIASTOLIC BLOOD PRESSURE: 71 MMHG | WEIGHT: 248 LBS

## 2017-06-27 DIAGNOSIS — Z01.411 ENCOUNTER FOR GYNECOLOGICAL EXAMINATION WITH ABNORMAL FINDING: Primary | ICD-10-CM

## 2017-06-27 DIAGNOSIS — R63.4 WEIGHT LOSS: ICD-10-CM

## 2017-06-27 DIAGNOSIS — N89.8 VAGINAL DISCHARGE: ICD-10-CM

## 2017-06-27 LAB — WET MOUNT POCT, WMPOCT: NORMAL

## 2017-06-27 PROCEDURE — 99001 SPECIMEN HANDLING PT-LAB: CPT | Performed by: OBSTETRICS & GYNECOLOGY

## 2017-06-27 RX ORDER — METRONIDAZOLE 500 MG/1
500 TABLET ORAL 2 TIMES DAILY
Qty: 14 TAB | Refills: 0 | Status: SHIPPED | OUTPATIENT
Start: 2017-06-27 | End: 2017-07-04

## 2017-06-27 NOTE — PROGRESS NOTES
Subjective:   65 y.o. female for Well Woman Check. No LMP recorded. Patient has had a hysterectomy. Social History: not sexually active. Pertinent past medical hstory: no history of HTN, DVT, CAD, DM, liver disease, migraines or smoking. Current Outpatient Prescriptions   Medication Sig Dispense Refill    metroNIDAZOLE (FLAGYL) 500 mg tablet Take 1 Tab by mouth two (2) times a day for 7 days. 14 Tab 0    lubiPROStone (AMITIZA) 8 mcg capsule Take 8 mcg by mouth two (2) times daily (with meals).  traMADol (ULTRAM) 50 mg tablet Take 1 Tab by mouth every six (6) hours as needed for Pain. Max Daily Amount: 200 mg. 50 Tab 0    omeprazole (PRILOSEC) 20 mg capsule Take 1 Cap by mouth two (2) times a day. 60 Cap 3    metFORMIN (GLUCOPHAGE) 500 mg tablet Take 1 Tab by mouth two (2) times daily (with meals). 60 Tab 3     Allergies   Allergen Reactions    Biaxin [Clarithromycin] Rash    Codeine Itching    Morphine Shortness of Breath and Itching     burning    Nubain [Nalbuphine] Itching     Past Medical History:   Diagnosis Date    Arthritis     Bipolar 1 disorder (HCC)     Carpal tunnel syndrome     bother arms    Fibromyalgia     GERD (gastroesophageal reflux disease)     Hypercholesterolemia     IBS (irritable bowel syndrome)     Sleep apnea     Uses CPAP    Slipped cervical disc      Past Surgical History:   Procedure Laterality Date    HX CYST REMOVAL      Left wrist    HX HYSTERECTOMY      uterus prolapse    HX ORTHOPAEDIC      Carpal tunnel    HX OTHER SURGICAL      cyst removal wrist and vagina    HX TUBAL LIGATION       Family History   Problem Relation Age of Onset    Alcohol abuse Father      Social History   Substance Use Topics    Smoking status: Never Smoker    Smokeless tobacco: Never Used    Alcohol use No        ROS:  Feeling well. No dyspnea or chest pain on exertion. No abdominal pain, change in bowel habits, black or bloody stools. No urinary tract symptoms.  GYN ROS: no breast pain or new or enlarging lumps on self exam, no vaginal bleeding, no pelvic pain, she complains of an abnormal vaginal discharge with itching and of unexplained weight loss. No neurological complaints. Objective:     Visit Vitals    /71    Pulse 68    Resp 18    Ht 5' 8\" (1.727 m)    Wt 248 lb (112.5 kg)    BMI 37.71 kg/m2     The patient appears well, alert, oriented x 3, in no distress. ENT normal.  Neck supple. No adenopathy or thyromegaly. KB. Lungs are clear, good air entry, no wheezes, rhonchi or rales. S1 and S2 normal, no murmurs, regular rate and rhythm. Abdomen soft without tenderness, guarding, mass or organomegaly. Extremities show no edema, normal peripheral pulses. Neurological is normal, no focal findings. BREAST EXAM: breasts appear normal, no suspicious masses, no skin or nipple changes or axillary nodes    PELVIC EXAM: VULVA: normal appearing vulva with no masses, tenderness or lesions, VAGINA: normal appearing vagina with normal color and discharge, no lesions, CERVIX: surgically absent, UTERUS: surgically absent, vaginal cuff well healed, ADNEXA: normal adnexa in size, nontender and no masses    Wet prep: no clue cells, no yeast, positive motile trichomonads, minimal WBCs    Assessment/Plan:   well woman  Weight loss, TSH and FT4 pending. Will contact the patient with results.   Trichomonas, Rx Flagyl  mammogram  pap smear not indicated  return annually or prn

## 2017-06-27 NOTE — MR AVS SNAPSHOT
Visit Information Date & Time Provider Department Dept. Phone Encounter #  
 6/27/2017 10:00 AM Jagdeep Garcia, [address] [phone] [id number] Upcoming Health Maintenance Date Due INFLUENZA AGE 9 TO ADULT 8/1/2017 BREAST CANCER SCRN MAMMOGRAM 12/16/2017 COLONOSCOPY 9/3/2018 PAP AKA CERVICAL CYTOLOGY 10/28/2019 Allergies as of 6/27/2017  Review Complete On: 4/17/2017 By: Chance Pepper RN Severity Noted Reaction Type Reactions Biaxin [Clarithromycin]  02/22/2016    Rash Codeine  07/29/2013    Itching Morphine  07/29/2013    Shortness of Breath, Itching  
 burning Nubain [Nalbuphine]  07/29/2013    Itching Current Immunizations  Never Reviewed Name Date Influenza Vaccine (Quad) PF 10/28/2016 11:20 AM  
 Tdap 10/28/2016 11:20 AM  
  
 Not reviewed this visit You Were Diagnosed With   
  
 Codes Comments Encounter for gynecological examination with abnormal finding    -  Primary ICD-10-CM: O27.813 ICD-9-CM: V72.31 Weight loss     ICD-10-CM: R63.4 ICD-9-CM: 783.21 Vaginal discharge     ICD-10-CM: N89.8 ICD-9-CM: 623.5 Vitals BP Pulse Resp Height(growth percentile) Weight(growth percentile) BMI  
 113/71 68 18 5' 8\" (1.727 m) 248 lb (112.5 kg) 37.71 kg/m2 OB Status Smoking Status Hysterectomy Never Smoker Vitals History BMI and BSA Data Body Mass Index Body Surface Area  
 37.71 kg/m 2 2.32 m 2 Preferred Pharmacy Pharmacy Name Phone [facility] [address], [address] [place] [address] [phone] Your Updated Medication List  
  
   
This list is accurate as of: 6/27/17 11:33 AM.  Always use your most recent med list.  
  
  
  
  
 AMITIZA 8 mcg capsule Generic drug:  lubiPROStone Take 8 mcg by mouth two (2) times daily (with meals). metFORMIN 500 mg tablet Commonly known as:  GLUCOPHAGE  
 Take 1 Tab by mouth two (2) times daily (with meals). omeprazole 20 mg capsule Commonly known as:  PriLOSEC Take 1 Cap by mouth two (2) times a day. traMADol 50 mg tablet Commonly known as:  ULTRAM  
Take 1 Tab by mouth every six (6) hours as needed for Pain. Max Daily Amount: 200 mg. We Performed the Following AMB POC SMEAR, ST[facility] & [facility] [id number] CPT(R)] TSH AND FREE T4 [55756 CPT(R)] To-Do List   
 06/27/2017 Imaging:  [facility] MAMMO BI SCREENING INCL CAD   
  
 06/27/2017 Lab:  TSH AND FREE T4 Introducing [facility]! [facility] introduces [employer] patient portal. Now you can access parts of your medical record, email your doctor's office, and request medication refills online. 1. In your internet browser, go to [email or web address] 2. Click on the First Time User? Click Here link in the Sign In box. You will see the New Member Sign Up page. 3. Enter your Propanc Access Code exactly as it appears below. You will not need to use this code after youve completed the sign-up process. If you do not sign up before the expiration date, you must request a new code. · Propanc Access Code: [email or web address] Expires: 9/25/2017 10:16 AM 
 
4. Enter the last four digits of your Social Security Number (xxxx) and Date of Birth (mm/dd/yyyy) as indicated and click Submit. You will be taken to the next sign-up page. 5. Create a Embarkt ID. This will be your Propanc login ID and cannot be changed, so think of one that is secure and easy to remember. 6. Create a Propanc password. You can change your password at any time. 7. Enter your Password Reset Question and Answer. This can be used at a later time if you forget your password. 8. Enter your e-mail address. You will receive e-mail notification when new information is available in [address] 9. Click Sign Up. You can now view and download portions of your medical record. 10. Click the Download Summary menu link to download a portable copy of your medical information. If you have questions, please visit the Frequently Asked Questions section of the Tagg[employer] website. Remember, Taggstr is NOT to be used for urgent needs. For medical emergencies, dial 911. Now available from your iPhone and Android! Please provide this summary of care documentation to your next provider. Your primary care clinician is listed as [phone]. If you have any questions after today's visit, please call [phone].

## 2017-06-28 LAB
T4 FREE SERPL-MCNC: 1.03 NG/DL (ref 0.82–1.77)
TSH SERPL DL<=0.005 MIU/L-ACNC: 0.88 UIU/ML (ref 0.45–4.5)

## 2017-07-03 ENCOUNTER — HOSPITAL ENCOUNTER (OUTPATIENT)
Dept: MAMMOGRAPHY | Age: 57
Discharge: HOME | End: 2017-07-03
Attending: OBSTETRICS & GYNECOLOGY
Payer: MEDICAID

## 2017-07-03 DIAGNOSIS — Z01.411 ENCOUNTER FOR GYNECOLOGICAL EXAMINATION WITH ABNORMAL FINDING: ICD-10-CM

## 2017-07-03 PROCEDURE — 77067 SCR MAMMO BI INCL CAD: CPT

## 2017-07-26 ENCOUNTER — OFFICE VISIT (OUTPATIENT)
Dept: FAMILY MEDICINE CLINIC | Age: 57
End: 2017-07-26

## 2017-07-26 ENCOUNTER — HOSPITAL ENCOUNTER (OUTPATIENT)
Dept: LAB | Age: 57
Discharge: HOME | End: 2017-07-26

## 2017-07-26 VITALS
WEIGHT: 243.8 LBS | BODY MASS INDEX: 36.95 KG/M2 | DIASTOLIC BLOOD PRESSURE: 79 MMHG | RESPIRATION RATE: 18 BRPM | HEIGHT: 68 IN | TEMPERATURE: 97.8 F | SYSTOLIC BLOOD PRESSURE: 133 MMHG | OXYGEN SATURATION: 98 % | HEART RATE: 73 BPM

## 2017-07-26 DIAGNOSIS — J01.10 SUBACUTE FRONTAL SINUSITIS: ICD-10-CM

## 2017-07-26 DIAGNOSIS — G89.29 CHRONIC MIDLINE LOW BACK PAIN WITHOUT SCIATICA: ICD-10-CM

## 2017-07-26 DIAGNOSIS — R35.0 FREQUENT URINATION: ICD-10-CM

## 2017-07-26 DIAGNOSIS — M54.50 CHRONIC MIDLINE LOW BACK PAIN WITHOUT SCIATICA: ICD-10-CM

## 2017-07-26 DIAGNOSIS — M54.9 UPPER BACK PAIN: Primary | ICD-10-CM

## 2017-07-26 DIAGNOSIS — K59.00 CONSTIPATION, UNSPECIFIED CONSTIPATION TYPE: ICD-10-CM

## 2017-07-26 DIAGNOSIS — R30.0 DYSURIA: ICD-10-CM

## 2017-07-26 LAB
BILIRUB UR QL STRIP: NEGATIVE
GLUCOSE UR-MCNC: NEGATIVE MG/DL
KETONES P FAST UR STRIP-MCNC: NEGATIVE MG/DL
PH UR STRIP: 6 [PH] (ref 4.6–8)
PROT UR QL STRIP: NEGATIVE MG/DL
SP GR UR STRIP: 1.02 (ref 1–1.03)
UA UROBILINOGEN AMB POC: NORMAL (ref 0.2–1)
URINALYSIS CLARITY POC: CLEAR
URINALYSIS COLOR POC: YELLOW
URINE BLOOD POC: NORMAL
URINE LEUKOCYTES POC: NEGATIVE
URINE NITRITES POC: NEGATIVE

## 2017-07-26 PROCEDURE — 99001 SPECIMEN HANDLING PT-LAB: CPT | Performed by: INTERNAL MEDICINE

## 2017-07-26 RX ORDER — AMOXICILLIN 500 MG/1
500 CAPSULE ORAL 3 TIMES DAILY
Qty: 30 CAP | Refills: 0 | Status: SHIPPED | OUTPATIENT
Start: 2017-07-26 | End: 2017-08-05

## 2017-07-26 RX ORDER — LACTULOSE 10 G/15ML
30 SOLUTION ORAL; RECTAL
Qty: 480 ML | Refills: 3 | Status: SHIPPED | OUTPATIENT
Start: 2017-07-26 | End: 2017-10-17

## 2017-07-26 RX ORDER — IBUPROFEN 800 MG/1
800 TABLET ORAL
Qty: 30 TAB | Refills: 1 | Status: SHIPPED | OUTPATIENT
Start: 2017-07-26 | End: 2020-04-23

## 2017-07-26 NOTE — PROGRESS NOTES
1. Have you been to the ER, urgent care clinic since your last visit? Hospitalized since your last visit? No    2. Have you seen or consulted any other health care providers outside of the [address] [place] [address] since your last visit? Include any pap smears or colon screening.  No

## 2017-07-26 NOTE — MR AVS SNAPSHOT
Visit Information Date & Time Provider Department Dept. Phone Encounter #  
 7/26/2017  9:15 AM Gretta Valenzuela, [address] [facility] [phone] [id number] Follow-up Instructions Return in about 1 month (around 8/26/2017). Upcoming Health Maintenance Date Due INFLUENZA AGE 9 TO ADULT 8/1/2017 COLONOSCOPY 9/3/2018 BREAST CANCER SCRN MAMMOGRAM 7/3/2019 PAP AKA CERVICAL CYTOLOGY 10/28/2019 DTaP/Tdap/Td series (2 - Td) 10/28/2026 Allergies as of 7/26/2017  Review Complete On: 7/26/2017 By: Gretta Valenzuela MD  
  
 Severity Noted Reaction Type Reactions Biaxin [Clarithromycin]  02/22/2016    Rash Codeine  07/29/2013    Itching Morphine  07/29/2013    Shortness of Breath, Itching  
 burning Nubain [Nalbuphine]  07/29/2013    Itching Current Immunizations  Never Reviewed Name Date Influenza Vaccine (Quad) PF 10/28/2016 11:20 AM  
 Tdap 10/28/2016 11:20 AM  
  
 Not reviewed this visit You Were Diagnosed With   
  
 Codes Comments Upper back pain    -  Primary ICD-10-CM: M54.9 ICD-9-CM: 724.5 Frequent urination     ICD-10-CM: R35.0 ICD-9-CM: 788.41 Chronic midline low back pain without sciatica     ICD-10-CM: M54.5, G89.29 ICD-9-CM: 724.2, 338.29 Dysuria     ICD-10-CM: R30.0 ICD-9-CM: 340. 1 Constipation, unspecified constipation type     ICD-10-CM: K59.00 ICD-9-CM: 564.00 Subacute frontal sinusitis     ICD-10-CM: J01.10 ICD-9-CM: 886.7 Vitals BP Pulse Temp Resp Height(growth percentile) Weight(growth percentile) 133/79 73 97.8 °F (36.6 °C) (Oral) 18 5' 8\" (1.727 m) 243 lb 12.8 oz (110.6 kg) SpO2 BMI OB Status Smoking Status 98% 37.07 kg/m2 Hysterectomy Never Smoker Vitals History BMI and BSA Data Body Mass Index Body Surface Area  
 37.07 kg/m 2 2.3 m 2 Preferred Pharmacy Pharmacy Name Phone [address] [phone] Your Updated Medication List  
  
   
This list is accurate as of: 7/26/17 10:40 AM.  Always use your most recent med list.  
  
  
  
  
 AMITIZA 8 mcg capsule Generic drug:  lubiPROStone Take 8 mcg by mouth two (2) times daily (with meals). amoxicillin 500 mg capsule Commonly known as:  AMOXIL Take 1 Cap by mouth three (3) times daily for 10 days. ibuprofen 800 mg tablet Commonly known as:  MOTRIN Take 1 Tab by mouth every eight (8) hours as needed for Pain. lactulose 10 gram/15 mL solution Commonly known as:  [place] Take 45 mL by mouth nightly. metFORMIN 500 mg tablet Commonly known as:  GLUCOPHAGE Take 1 Tab by mouth two (2) times daily (with meals). omeprazole 20 mg capsule Commonly known as:  PriLOSEC Take 1 Cap by mouth two (2) times a day. traMADol 50 mg tablet Commonly known as:  ULTRAM  
Take 1 Tab by mouth every six (6) hours as needed for Pain. Max Daily Amount: 200 mg. Prescriptions Sent to Pharmacy Refills  
 amoxicillin (AMOXIL) 500 mg capsule 0 Sig: Take 1 Cap by mouth three (3) times daily for 10 days. Class: Normal  
 Pharmacy: [address] Ph #: [phone] Route: Oral  
 lactulose (CHRONULAC) 10 gram/15 mL solution 3 Sig: Take 45 mL by mouth nightly. Class: Normal  
 Pharmacy: [address] Ph #: [phone] Route: Oral  
 ibuprofen (MOTRIN) 800 mg tablet 1 Sig: Take 1 Tab by mouth every eight (8) hours as needed for Pain. Class: Normal  
 Pharmacy: [address] Ph #: [phone] Route: Oral  
  
We Performed the Following AMB POC URINALYSIS DIP STICK AUTO W/O MICRO [33892 CPT(R)] Follow-up Instructions Return in about 1 month (around 8/26/2017). To-Do List   
 07/26/2017 Microbiology:  CULTURE, URINE   
  
 07/26/2017 Imaging:  XR SPINE LUMB 2 OR 3 V   
  
 07/26/2017 Imaging:  XR SPINE THORAC 2 V [facility]! [facility] introduces [employer] patient portal. Now you can access parts of your medical record, email your doctor's office, and request medication refills online. 1. In your internet browser, go to [email or web address] 2. Click on the First Time User? Click Here link in the Sign In box. You will see the New Member Sign Up page. 3. Enter your [employer] Access Code exactly as it appears below. You will not need to use this code after youve completed the sign-up process. If you do not sign up before the expiration date, you must request a new code. · [employer] Access Code: [email or web address] Expires: 9/25/2017 10:16 AM 
 
4. Enter the last four digits of your Social Security Number (xxxx) and Date of Birth (mm/dd/yyyy) as indicated and click Submit. You will be taken to the next sign-up page. 5. Create a [employer] ID. This will be your [employer] login ID and cannot be changed, so think of one that is secure and easy to remember. 6. Create a [employer] password. You can change your password at any time. 7. Enter your Password Reset Question and Answer. This can be used at a later time if you forget your password. 8. Enter your e-mail address. You will receive e-mail notification when new information is available in [address] 9. Click Sign Up. You can now view and download portions of your medical record. 10. Click the Download Summary menu link to download a portable copy of your medical information. If you have questions, please visit the Frequently Asked Questions section of the [employer] website. Remember, [employer] is NOT to be used for urgent needs. For medical emergencies, dial 911. Now available from your iPhone and Android! Please provide this summary of care documentation to your next provider. Your primary care clinician is listed as Maranda Roy. If you have any questions after today's visit, please call [phone].

## 2017-07-28 LAB — BACTERIA UR CULT: NORMAL

## 2017-10-17 ENCOUNTER — OFFICE VISIT (OUTPATIENT)
Dept: FAMILY MEDICINE CLINIC | Age: 57
End: 2017-10-17

## 2017-10-17 ENCOUNTER — HOSPITAL ENCOUNTER (OUTPATIENT)
Dept: LAB | Age: 57
Discharge: HOME | End: 2017-10-17

## 2017-10-17 VITALS
SYSTOLIC BLOOD PRESSURE: 128 MMHG | HEIGHT: 68 IN | RESPIRATION RATE: 18 BRPM | TEMPERATURE: 97.7 F | HEART RATE: 76 BPM | BODY MASS INDEX: 35.4 KG/M2 | WEIGHT: 233.6 LBS | DIASTOLIC BLOOD PRESSURE: 81 MMHG | OXYGEN SATURATION: 99 %

## 2017-10-17 DIAGNOSIS — G89.29 CHRONIC LOW BACK PAIN WITHOUT SCIATICA, UNSPECIFIED BACK PAIN LATERALITY: ICD-10-CM

## 2017-10-17 DIAGNOSIS — R63.4 WEIGHT LOSS: ICD-10-CM

## 2017-10-17 DIAGNOSIS — R73.01 IFG (IMPAIRED FASTING GLUCOSE): ICD-10-CM

## 2017-10-17 DIAGNOSIS — M54.9 UPPER BACK PAIN: Primary | ICD-10-CM

## 2017-10-17 DIAGNOSIS — M54.50 CHRONIC LOW BACK PAIN WITHOUT SCIATICA, UNSPECIFIED BACK PAIN LATERALITY: ICD-10-CM

## 2017-10-17 DIAGNOSIS — K08.89 PAIN, DENTAL: ICD-10-CM

## 2017-10-17 DIAGNOSIS — M79.7 FIBROMYALGIA: ICD-10-CM

## 2017-10-17 PROCEDURE — 99001 SPECIMEN HANDLING PT-LAB: CPT | Performed by: INTERNAL MEDICINE

## 2017-10-17 RX ORDER — AMITRIPTYLINE HYDROCHLORIDE 50 MG/1
50 TABLET, FILM COATED ORAL
Qty: 30 TAB | Refills: 3 | Status: SHIPPED | OUTPATIENT
Start: 2017-10-17 | End: 2020-04-23

## 2017-10-17 RX ORDER — OXYCODONE AND ACETAMINOPHEN 5; 325 MG/1; MG/1
1 TABLET ORAL
Qty: 15 TAB | Refills: 0 | Status: SHIPPED | OUTPATIENT
Start: 2017-10-17 | End: 2020-04-23

## 2017-10-17 RX ORDER — AMITRIPTYLINE HYDROCHLORIDE 50 MG/1
TABLET, FILM COATED ORAL
COMMUNITY
End: 2017-10-17 | Stop reason: SDUPTHER

## 2017-10-17 RX ORDER — AMOXICILLIN 500 MG/1
500 CAPSULE ORAL 3 TIMES DAILY
Qty: 30 CAP | Refills: 0 | Status: SHIPPED | OUTPATIENT
Start: 2017-10-17 | End: 2017-10-27

## 2017-10-17 NOTE — PROGRESS NOTES
Chief Complaint   Patient presents with    Dental Pain     bottom right back    Epigastric Pain    Medication Refill     1. Have you been to the ER, urgent care clinic since your last visit? Hospitalized since your last visit? No    2. Have you seen or consulted any other health care providers outside of the [address] [place] [address] since your last visit? Include any pap smears or colon screening.  No

## 2017-10-17 NOTE — MR AVS SNAPSHOT
Visit Information Date & Time Provider Department Dept. Phone Encounter #  
 10/17/2017  9:45 AM [id number], [address] [facility] [phone] [id number] Follow-up Instructions Return in about 1 month (around 11/17/2017). Upcoming Health Maintenance Date Due COLONOSCOPY 9/3/2018 BREAST CANCER SCRN MAMMOGRAM 7/3/2019 PAP AKA CERVICAL CYTOLOGY 10/28/2019 DTaP/Tdap/Td series (2 - Td) 10/28/2026 Allergies as of 10/17/2017  Review Complete On: 10/17/2017 By: [id number] WINNIE Brady MD  
  
 Severity Noted Reaction Type Reactions Biaxin [Clarithromycin]  02/22/2016    Rash Codeine  07/29/2013    Itching Morphine  07/29/2013    Shortness of Breath, Itching  
 burning Nubain [Nalbuphine]  07/29/2013    Itching Current Immunizations  Never Reviewed Name Date Influenza Vaccine (Quad) PF 10/28/2016 11:20 AM  
 Tdap 10/28/2016 11:20 AM  
  
 Not reviewed this visit You Were Diagnosed With   
  
 Codes Comments Upper back pain    -  Primary ICD-10-CM: M54.9 ICD-9-CM: 724.5 Chronic low back pain without sciatica, unspecified back pain laterality     ICD-10-CM: M54.5, G89.29 ICD-9-CM: 724.2, 338.29 IFG (impaired fasting glucose)     ICD-10-CM: R73.01 
ICD-9-CM: 790.21 Weight loss     ICD-10-CM: R63.4 ICD-9-CM: 783.21 Pain, dental     ICD-10-CM: D95.86 ICD-9-CM: 525.9 Fibromyalgia     ICD-10-CM: M79.7 ICD-9-CM: 729.1 Vitals BP Pulse Temp Resp Height(growth percentile) Weight(growth percentile) 128/81 (BP 1 Location: Right arm, BP Patient Position: At rest) 76 97.7 °F (36.5 °C) (Oral) 18 5' 8\" (1.727 m) 233 lb 9.6 oz (106 kg) SpO2 BMI OB Status Smoking Status 99% 35.52 kg/m2 Hysterectomy Never Smoker Vitals History BMI and BSA Data Body Mass Index Body Surface Area 35.52 kg/m 2 2.26 m 2 Preferred Pharmacy Pharmacy Name Phone [facility], [address] [facility] [address] [facility] [address] [place] [address] Your Updated Medication List  
  
   
This list is accurate as of: 10/17/17 10:37 AM.  Always use your most recent med list.  
  
  
  
  
 amitriptyline 50 mg tablet Commonly known as:  ELAVIL Take 1 Tab by mouth nightly. amoxicillin 500 mg capsule Commonly known as:  AMOXIL Take 1 Cap by mouth three (3) times daily for 10 days. ibuprofen 800 mg tablet Commonly known as:  MOTRIN Take 1 Tab by mouth every eight (8) hours as needed for Pain. omeprazole 20 mg capsule Commonly known as:  PriLOSEC Take 1 Cap by mouth two (2) times a day. oxyCODONE-acetaminophen 5-325 mg per tablet Commonly known as:  PERCOCET Take 1 Tab by mouth every four (4) hours as needed for Pain. Max Daily Amount: 6 Tabs. Prescriptions Printed Refills  
 oxyCODONE-acetaminophen (PERCOCET) 5-325 mg per tablet 0 Sig: Take 1 Tab by mouth every four (4) hours as needed for Pain. Max Daily Amount: 6 Tabs. Class: Print Route: Oral  
  
Prescriptions Sent to Pharmacy Refills  
 amitriptyline (ELAVIL) 50 mg tablet 3 Sig: Take 1 Tab by mouth nightly. Class: Normal  
 Pharmacy: [address] [place] [address], [address] [place] [address] Ph #: [phone] Route: Oral  
 amoxicillin (AMOXIL) 500 mg capsule 0 Sig: Take 1 Cap by mouth three (3) times daily for 10 days. Class: Normal  
 Pharmacy: [address] [place] [address], [address] [place] [address] Ph #: [phone] Route: Oral  
  
Follow-up Instructions Return in about 1 month (around 11/17/2017). To-Do List   
 10/17/2017 Lab:  CBC WITH AUTOMATED DIFF   
  
 10/17/2017 Lab:  HEMOGLOBIN A1C WITH EAG   
  
 10/17/2017 Lab:  LIPID PANEL   
  
 10/17/2017 Lab:  METABOLIC PANEL, COMPREHENSIVE   
  
 10/17/2017 Lab:  TSH 3RD GENERATION   
  
 10/17/2017   Imaging:  XR SPINE LUMB 2 OR 3 V   
  
 10/17/2017 Imaging:  XR SPINE THORAC 2 V [facility]! Aydin Forrest introduces [email or web address] patient portal. Now you can access parts of your medical record, email your doctor's office, and request medication refills online. 1. In your internet browser, go to [email or web address] 2. Click on the First Time User? Click Here link in the Sign In box. You will see the New Member Sign Up page. 3. Enter your [email or web address] Access Code exactly as it appears below. You will not need to use this code after youve completed the sign-up process. If you do not sign up before the expiration date, you must request a new code. · [email or web address] Access Code: [email or web address] Expires: 1/15/2018 10:37 AM 
 
4. Enter the last four digits of your Social Security Number (xxxx) and Date of Birth (mm/dd/yyyy) as indicated and click Submit. You will be taken to the next sign-up page. 5. Create a [email or web address] ID. This will be your [email or web address] login ID and cannot be changed, so think of one that is secure and easy to remember. 6. Create a [email or web address] password. You can change your password at any time. 7. Enter your Password Reset Question and Answer. This can be used at a later time if you forget your password. 8. Enter your e-mail address. You will receive e-mail notification when new information is available in [address] 9. Click Sign Up. You can now view and download portions of your medical record. 10. Click the Download Summary menu link to download a portable copy of your medical information. If you have questions, please visit the Frequently Asked Questions section of the [email or web address] website. Remember, [email or web address] is NOT to be used for urgent needs. For medical emergencies, dial 911. Now available from your iPhone and Android! Please provide this summary of care documentation to your next provider. Your primary care clinician is listed as Junior Renner.  If you have any questions after today's visit, please call [phone].

## 2017-10-17 NOTE — PROGRESS NOTES
Dilip Heller is a 65 y.o.  female and presents with     Chief Complaint   Patient presents with    Dental Pain     bottom right back    Epigastric Pain    Medication Refill    Weight Loss       Pt has lost weight. Pt says she has been trying to loose wt. Pt has been going through stress. Pt is getting a divorce. Pt doesh ave upper and lower back pain. Pt does want refill on Elavil. Pt does have soime tightness around her upper abdomen. Pt had CT abdomen as well as EGD that were non revealing. Pt did have some cosntipation but that seems to have Improved and so she stopped taking lactulose and Amitiza. Pt does have some dental pain        Past Medical History:   Diagnosis Date    Arthritis     Bipolar 1 disorder ([address]     Carpal tunnel syndrome     bother arms    Fibromyalgia     GERD (gastroesophageal reflux disease)     Hypercholesterolemia     IBS (irritable bowel syndrome)     Sleep apnea     Uses CPAP    Slipped cervical disc      Past Surgical History:   Procedure Laterality Date    HX CYST REMOVAL      Left wrist    HX HYSTERECTOMY      uterus prolapse    HX ORTHOPAEDIC      Carpal tunnel    HX OTHER SURGICAL      cyst removal wrist and vagina    HX TUBAL LIGATION       Current Outpatient Prescriptions   Medication Sig    amitriptyline (ELAVIL) 50 mg tablet Take 1 Tab by mouth nightly.  oxyCODONE-acetaminophen (PERCOCET) 5-325 mg per tablet Take 1 Tab by mouth every four (4) hours as needed for Pain. Max Daily Amount: 6 Tabs.  amoxicillin (AMOXIL) 500 mg capsule Take 1 Cap by mouth three (3) times daily for 10 days.  ibuprofen (MOTRIN) 800 mg tablet Take 1 Tab by mouth every eight (8) hours as needed for Pain.  omeprazole (PRILOSEC) 20 mg capsule Take 1 Cap by mouth two (2) times a day. No current facility-administered medications for this visit.       Health Maintenance   Topic Date Due    COLONOSCOPY  09/03/2018    BREAST CANCER SCRN MAMMOGRAM  07/03/2019    PAP AKA CERVICAL CYTOLOGY  10/28/2019    DTaP/Tdap/Td series (2 - Td) 10/28/2026    Hepatitis C Screening  Completed    INFLUENZA AGE 9 TO ADULT  Completed     Immunization History   Administered Date(s) Administered    Influenza Vaccine (Quad) PF 10/28/2016    Tdap 10/28/2016     No LMP recorded. Patient has had a hysterectomy. Allergies and Intolerances: Allergies   Allergen Reactions    Biaxin [Clarithromycin] Rash    Codeine Itching    Morphine Shortness of Breath and Itching     burning    Nubain [Nalbuphine] Itching       Family History:   Family History   Problem Relation Age of Onset    Alcohol abuse Father        Social History:   She  reports that she has never smoked. She has never used smokeless tobacco.  She  reports that she does not drink alcohol.             Review of Systems: pos for dental pain  General: negative for - chills, fatigue, fever, pos for  weight change  Psych: negative for - anxiety, depression, irritability or mood swings, pos for   ENT: negative for - headaches, hearing change, nasal congestion, oral lesions, sneezing or sore throat  Heme/ Lymph: negative for - bleeding problems, bruising, pallor or swollen lymph nodes  Endo: negative for - hot flashes, polydipsia/polyuria or temperature intolerance  Resp: negative for - cough, shortness of breath or wheezing  CV: negative for - chest pain, edema or palpitations  GI: negative for - abdominal pain, change in bowel habits, constipation, diarrhea or nausea/vomiting  : negative for - dysuria, hematuria, incontinence, pelvic pain or vulvar/vaginal symptoms  MSK: negative for - joint pain, joint swelling or muscle pain, pos for fibromyaliga  Neuro: negative for - confusion, headaches, seizures or weakness  Derm: negative for - dry skin, hair changes, rash or skin lesion changes          Physical:   Vitals:   Vitals:    10/17/17 1002   BP: 128/81   Pulse: 76   Resp: 18   Temp: 97.7 °F (36.5 °C)   TempSrc: Oral   SpO2: 99%   Weight: 233 lb 9.6 oz (106 kg)   Height: 5' 8\" (1.727 m)           Exam:   HEENT- atraumatic,normocephalic, awake, oriented, well nourished  Neck - supple,no enlarged lymph nodes, no JVD, no thyromegaly  Chest- CTA, no rhonchi, no crackles  Heart- rrr, no murmurs / gallop/rub  Abdomen- soft,BS+,NT, no hepatosplenomegaly  Ext - no c/c/edema   Neuro- no focal deficits. Power 5/5 all extremities  Skin - warm,dry, no obvious rashes. Back - mild midthoracic tenderness      Review of Data:   LABS:   Lab Results   Component Value Date/Time    WBC 8.9 12/06/2016 12:00 AM    HGB 12.1 12/06/2016 12:00 AM    HCT 35.9 12/06/2016 12:00 AM    PLATELET 322 94/31/9041 12:00 AM     Lab Results   Component Value Date/Time    Sodium 142 12/06/2016 12:00 AM    Potassium 4.2 12/06/2016 12:00 AM    Chloride 101 12/06/2016 12:00 AM    CO2 24 12/06/2016 12:00 AM    Glucose 106 12/06/2016 12:00 AM    BUN 12 12/06/2016 12:00 AM    Creatinine 0.93 12/06/2016 12:00 AM     Lab Results   Component Value Date/Time    Cholesterol, total 203 10/28/2016 11:42 AM    HDL Cholesterol 52 10/28/2016 11:42 AM    LDL, calculated 123 10/28/2016 11:42 AM    Triglyceride 140 10/28/2016 11:42 AM     No results found for: GPT        Impression / Plan:        ICD-10-CM ICD-9-CM    1. Upper back pain M54.9 724.5 XR SPINE THORAC 2 V      oxyCODONE-acetaminophen (PERCOCET) 5-325 mg per tablet   2. Chronic low back pain without sciatica, unspecified back pain laterality M54.5 724.2 XR SPINE LUMB 2 OR 3 V    G89.29 338.29    3. IFG (impaired fasting glucose) R73.01 790.21 HEMOGLOBIN A1C WITH EAG   4. Weight loss R63.4 783.21 TSH 3RD GENERATION      CBC WITH AUTOMATED DIFF      LIPID PANEL      METABOLIC PANEL, COMPREHENSIVE   5. Pain, dental K08.89 525.9 amoxicillin (AMOXIL) 500 mg capsule   6. Fibromyalgia M79.7 729.1 amitriptyline (ELAVIL) 50 mg tablet     Asked pt to make apt with dentist.      Explained to patient risk benefits of the medications. Advised patient to stop meds if having any side effects. Pt verbalized understanding of the instructions. I have discussed the diagnosis with the patient and the intended plan as seen in the above orders. The patient has received an after-visit summary and questions were answered concerning future plans. I have discussed medication side effects and warnings with the patient as well. I have reviewed the plan of care with the patient, accepted their input and they are in agreement with the treatment goals. Reviewed plan of care. Patient has provided input and agrees with goals.     Follow-up Disposition: Not on Ramona Barboza MD

## 2017-10-18 LAB
ALBUMIN SERPL-MCNC: 4.1 G/DL (ref 3.5–5.5)
ALBUMIN/GLOB SERPL: 1.4 {RATIO} (ref 1.2–2.2)
ALP SERPL-CCNC: 67 IU/L (ref 39–117)
ALT SERPL-CCNC: 11 IU/L (ref 0–32)
AST SERPL-CCNC: 14 IU/L (ref 0–40)
BASOPHILS # BLD AUTO: 0 X10E3/UL (ref 0–0.2)
BASOPHILS NFR BLD AUTO: 0 %
BILIRUB SERPL-MCNC: 0.4 MG/DL (ref 0–1.2)
BUN SERPL-MCNC: 10 MG/DL (ref 6–24)
BUN/CREAT SERPL: 12 (ref 9–23)
CALCIUM SERPL-MCNC: 9.3 MG/DL (ref 8.7–10.2)
CHLORIDE SERPL-SCNC: 103 MMOL/L (ref 96–106)
CHOLEST SERPL-MCNC: 191 MG/DL (ref 100–199)
CO2 SERPL-SCNC: 24 MMOL/L (ref 18–29)
CREAT SERPL-MCNC: 0.81 MG/DL (ref 0.57–1)
EOSINOPHIL # BLD AUTO: 0.1 X10E3/UL (ref 0–0.4)
EOSINOPHIL NFR BLD AUTO: 1 %
ERYTHROCYTE [DISTWIDTH] IN BLOOD BY AUTOMATED COUNT: 14 % (ref 12.3–15.4)
EST. AVERAGE GLUCOSE BLD GHB EST-MCNC: 111 MG/DL
GLOBULIN SER CALC-MCNC: 3 G/DL (ref 1.5–4.5)
GLUCOSE SERPL-MCNC: 105 MG/DL (ref 65–99)
HBA1C MFR BLD: 5.5 % (ref 4.8–5.6)
HCT VFR BLD AUTO: 40.1 % (ref 34–46.6)
HDLC SERPL-MCNC: 52 MG/DL
HGB BLD-MCNC: 13.4 G/DL (ref 11.1–15.9)
IMM GRANULOCYTES # BLD: 0 X10E3/UL (ref 0–0.1)
IMM GRANULOCYTES NFR BLD: 0 %
INTERPRETATION, 910389: NORMAL
LDLC SERPL CALC-MCNC: 120 MG/DL (ref 0–99)
LYMPHOCYTES # BLD AUTO: 3 X10E3/UL (ref 0.7–3.1)
LYMPHOCYTES NFR BLD AUTO: 44 %
MCH RBC QN AUTO: 30.2 PG (ref 26.6–33)
MCHC RBC AUTO-ENTMCNC: 33.4 G/DL (ref 31.5–35.7)
MCV RBC AUTO: 91 FL (ref 79–97)
MONOCYTES # BLD AUTO: 0.5 X10E3/UL (ref 0.1–0.9)
MONOCYTES NFR BLD AUTO: 7 %
NEUTROPHILS # BLD AUTO: 3.2 X10E3/UL (ref 1.4–7)
NEUTROPHILS NFR BLD AUTO: 48 %
PLATELET # BLD AUTO: 333 X10E3/UL (ref 150–379)
POTASSIUM SERPL-SCNC: 4.2 MMOL/L (ref 3.5–5.2)
PROT SERPL-MCNC: 7.1 G/DL (ref 6–8.5)
RBC # BLD AUTO: 4.43 X10E6/UL (ref 3.77–5.28)
SODIUM SERPL-SCNC: 143 MMOL/L (ref 134–144)
TRIGL SERPL-MCNC: 97 MG/DL (ref 0–149)
TSH SERPL DL<=0.005 MIU/L-ACNC: 0.99 UIU/ML (ref 0.45–4.5)
VLDLC SERPL CALC-MCNC: 19 MG/DL (ref 5–40)
WBC # BLD AUTO: 6.8 X10E3/UL (ref 3.4–10.8)

## 2017-11-30 ENCOUNTER — DOCUMENTATION ONLY (OUTPATIENT)
Dept: FAMILY MEDICINE CLINIC | Age: 57
End: 2017-11-30

## 2017-11-30 NOTE — LETTER
11/30/2017 Ebonie Garcia [address] Dear Ms. Ebonie Garcia, We had an appointment reserved for you 11/17/2017 and were concerned when you did not show or call within 24 hours to cancel the appointment. As mentioned in a previous letter to you, appointment time is limited and no-showed appointments may prevent another sick individual who needs to be seen from getting a preferred appointment time. Please note that a continued pattern of not showing for appointments may result in your inability to pre-book future appointments as well as possible discharge from the practice. Please call us at your earliest convenience to reschedule your appointment as your provider felt it was important to see you. Thank you for your anticipated cooperation. Sincerely, The scheduling staff [address] [place] [address] [phone]

## 2018-07-16 ENCOUNTER — TELEPHONE (OUTPATIENT)
Dept: FAMILY MEDICINE CLINIC | Age: 58
End: 2018-07-16

## 2020-04-23 ENCOUNTER — VIRTUAL VISIT (OUTPATIENT)
Dept: FAMILY MEDICINE CLINIC | Age: 60
End: 2020-04-23

## 2020-04-23 DIAGNOSIS — F31.30 BIPOLAR AFFECTIVE DISORDER, CURRENT EPISODE DEPRESSED, CURRENT EPISODE SEVERITY UNSPECIFIED (HCC): ICD-10-CM

## 2020-04-23 DIAGNOSIS — M51.26 LUMBAR HERNIATED DISC: ICD-10-CM

## 2020-04-23 DIAGNOSIS — M79.7 FIBROMYALGIA: ICD-10-CM

## 2020-04-23 DIAGNOSIS — Z86.718 HISTORY OF DVT (DEEP VEIN THROMBOSIS): Primary | ICD-10-CM

## 2020-04-23 RX ORDER — WARFARIN 6 MG/1
6 TABLET ORAL
COMMUNITY
Start: 2020-01-12 | End: 2020-04-23 | Stop reason: ALTCHOICE

## 2020-04-23 RX ORDER — WARFARIN SODIUM 5 MG/1
5 TABLET ORAL 2 TIMES WEEKLY
COMMUNITY
Start: 2020-01-12 | End: 2020-04-23 | Stop reason: ALTCHOICE

## 2020-04-23 RX ORDER — TIZANIDINE 4 MG/1
4 TABLET ORAL
Qty: 120 TAB | Refills: 0 | Status: SHIPPED | OUTPATIENT
Start: 2020-04-23

## 2020-04-23 NOTE — PROGRESS NOTES
Brooks Cruz is a 63 y.o. female who was seen by synchronous (real-time) audio-video technology using doxy. me on 4/23/2020. Location of the patient: Home    [facility] of the provider: [facility]    Consent:  She and/or health care decision maker is aware that that she may receive a bill for this telehealth service, depending on her insurance coverage, and has provided verbal consent to proceed: Yes    Subjective:   Brooks Cruz is a 63 y.o. female who presents today for management of    Chief Complaint   Patient presents with   [address] Blood Clot       Previous PCP: Dr. Shahid Tao then patient moved to [place]. She moved 7 months ago. Patient was diagnosed with right upper extremity in 7/2018 while in [place]. She developed right hand pain and swelling after received IV medication for nausea. The pain worsened and a duplex of the right upper extremity showed acute DVT on the axillary, brachial, basilic, radial, ulnar and cephalic veins. Patient has been on Coumadin since. Patient continues to have pain and tingling on her right arm. She had a duplex US last year which she reported as normal. Last INR check was 3 months ago which was therapeutic. Patient denies any current bleeding. Patient denies family history of hypercoagulable disorder. Patient has history of fibromyalgia. She has pain on neck, back, shoulders and hips. This problem is longstanding. Pain is moderate in severity. Previous therapy: physical therapy, opioids, gabapentin, muscle relaxants, Cymbalta, amitriptyline. She reports that only opioids were effective, but she had GI side effects from them and would like to avoid them as much as possible. Patient reports of a history of bipolar disorder. She denies any symptoms currently. She does not take any medications for bipolar disorder.     Problem List  Patient Active Problem List    Diagnosis Date Noted    History of DVT (deep vein thrombosis) 04/23/2020    Fibromyalgia 04/23/2020    Lumbar herniated disc 05/06/2016    [facility], [facility] 09[facility]11[facility]2013       Current Medications  Current Outpatient Medications   Medication Sig    apixaban (ELIQUIS) 5 mg tablet Take 1 Tab by mouth two (2) times a day.  tiZANidine (ZANAFLEX) 4 mg tablet Take 1 Tab by mouth three (3) times daily as needed for Muscle Spasm(s). No current facility-administered medications for this visit.         Allergies/Drug Reactions  Allergies   Allergen Reactions    Biaxin [Clarithromycin] Rash    Codeine Itching    Gabapentin Drowsiness    Morphine Shortness of Breath and Itching     burning    Nubain [Nalbuphine] Itching        Social History  Social History     Socioeconomic History    Marital status: SINGLE     Spouse name: Not on file    Number of children: Not on file    Years of education: Not on file    Highest education level: Not on file   Occupational History    Not on file   Social Needs    Financial resource strain: Not on file    Food insecurity     Worry: Not on file     Inability: Not on file    Transportation needs     Medical: Not on file     Non-medical: Not on file   Tobacco Use    Smoking status: Never Smoker    Smokeless tobacco: Never Used   Substance and Sexual Activity    Alcohol use: No    Drug use: No    Sexual activity: Not on file   Lifestyle    Physical activity     Days per week: Not on file     Minutes per session: Not on file    Stress: Not on file   Relationships    Social connections     Talks on phone: Not on file     Gets together: Not on file     Attends [employer] service: Not on file     Active member of club or organization: Not on file     Attends meetings of clubs or organizations: Not on file     Relationship status: Not on file    Intimate partner violence     Fear of current or ex partner: Not on file     Emotionally abused: Not on file     Physically abused: Not on file     Forced sexual activity: Not on file   Other Topics Concern    Not on file   Social History Narrative    Not on file       Review of Systems  Review of Systems   Constitutional: Negative for chills, fever, malaise/fatigue and weight loss. Respiratory: Negative for shortness of breath. Cardiovascular: Negative for chest pain, palpitations and leg swelling. Gastrointestinal: Negative for abdominal pain, heartburn, nausea and vomiting. Musculoskeletal: Positive for back pain, joint pain, myalgias and neck pain. Neurological: Positive for tingling. Negative for dizziness and headaches. Psychiatric/Behavioral: Negative for depression and memory loss. The patient is not nervous/anxious and does not have insomnia. Objective:     General: alert, cooperative, no distress   Mental  status: mental status: alert, oriented to person, place, and time, normal mood, behavior, speech, dress, motor activity, and thought processes   Resp: resp: normal effort and no respiratory distress   Neuro: neuro: no gross deficits   Skin: skin: no discoloration or lesions of concern on visible areas     Due to this being a TeleHealth evaluation, many elements of the physical examination are unable to be assessed. Assessment & Plan:   1. History of DVT (deep vein thrombosis)  - d/c warfarin  - start apixaban (ELIQUIS) 5 mg tablet; Take 1 Tab by mouth two (2) times a day. Dispense: 60 Tab; Refill: 2  - will need anticoagulation indefinitely    2. Fibromyalgia  - failed several therapies int he past as noted on HPI   - trial of tiZANidine (ZANAFLEX) 4 mg tablet; Take 1 Tab by mouth three (3) times daily as needed for Muscle Spasm(s). Dispense: 120 Tab; Refill: 0    3. Lumbar herniated disc  - tiZANidine (ZANAFLEX) 4 mg tablet; Take 1 Tab by mouth three (3) times daily as needed for Muscle Spasm(s). Dispense: 120 Tab; Refill: 0    4.  Bipolar affective disorder, current episode depressed, current episode severity unspecified ([facility]  - stable, asymptomatic        Follow-up and Dispositions    · Return in about 2 months (around 6/23/2020) for EOV. We discussed the expected course, resolution and complications of the diagnosis(es) in detail. Medication risks, benefits, costs, interactions, and alternatives were discussed as indicated. I advised her to contact the office if her condition worsens, changes or fails to improve as anticipated. She expressed understanding with the diagnosis(es) and plan. Pursuant to the emergency declaration under the [address] [place] [address], [facility] waiver authority and the [employer] Act, this Virtual  Visit was conducted, with patient's consent, to reduce the patient's risk of exposure to COVID-19 and provide continuity of care for an established patient. Services were provided through a video synchronous discussion virtually to substitute for in-person clinic visit.     Pat Meadows MD

## 2020-06-26 ENCOUNTER — VIRTUAL VISIT (OUTPATIENT)
Dept: FAMILY MEDICINE CLINIC | Age: 60
End: 2020-06-26

## 2020-06-26 DIAGNOSIS — M79.7 FIBROMYALGIA: ICD-10-CM

## 2020-06-26 DIAGNOSIS — M51.26 LUMBAR HERNIATED DISC: ICD-10-CM

## 2020-06-26 DIAGNOSIS — M54.2 CHRONIC CERVICAL PAIN: ICD-10-CM

## 2020-06-26 DIAGNOSIS — K58.2 IRRITABLE BOWEL SYNDROME WITH BOTH CONSTIPATION AND DIARRHEA: ICD-10-CM

## 2020-06-26 DIAGNOSIS — M79.601 RIGHT UPPER LIMB PAIN: ICD-10-CM

## 2020-06-26 DIAGNOSIS — G89.29 CHRONIC CERVICAL PAIN: ICD-10-CM

## 2020-06-26 DIAGNOSIS — J00 ACUTE RHINITIS: ICD-10-CM

## 2020-06-26 DIAGNOSIS — R11.0 NAUSEA: ICD-10-CM

## 2020-06-26 DIAGNOSIS — Z86.718 HISTORY OF DVT (DEEP VEIN THROMBOSIS): Primary | ICD-10-CM

## 2020-06-26 DIAGNOSIS — Z13.1 SCREENING FOR DIABETES MELLITUS: ICD-10-CM

## 2020-06-26 DIAGNOSIS — Z13.220 ENCOUNTER FOR LIPID SCREENING FOR CARDIOVASCULAR DISEASE: ICD-10-CM

## 2020-06-26 DIAGNOSIS — Z13.6 ENCOUNTER FOR LIPID SCREENING FOR CARDIOVASCULAR DISEASE: ICD-10-CM

## 2020-06-26 RX ORDER — ONDANSETRON 4 MG/1
4 TABLET, ORALLY DISINTEGRATING ORAL
Qty: 30 TAB | Refills: 0 | Status: SHIPPED | OUTPATIENT
Start: 2020-06-26
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
## 2020-08-19 LAB
ALBUMIN SERPL-MCNC: 4 G/DL (ref 3.8–4.9)
ALBUMIN/GLOB SERPL: 1.4 {RATIO} (ref 1.2–2.2)
ALP SERPL-CCNC: 72 IU/L (ref 39–117)
ALT SERPL-CCNC: 10 IU/L (ref 0–32)
APPEARANCE UR: CLEAR
AST SERPL-CCNC: 10 IU/L (ref 0–40)
BASOPHILS # BLD AUTO: 0 X10E3/UL (ref 0–0.2)
BASOPHILS NFR BLD AUTO: 0 %
BILIRUB SERPL-MCNC: 0.3 MG/DL (ref 0–1.2)
BILIRUB UR QL STRIP: NEGATIVE
BUN SERPL-MCNC: 15 MG/DL (ref 6–24)
BUN/CREAT SERPL: 16 (ref 9–23)
CALCIUM SERPL-MCNC: 9.5 MG/DL (ref 8.7–10.2)
CHLORIDE SERPL-SCNC: 105 MMOL/L (ref 96–106)
CHOLEST SERPL-MCNC: 190 MG/DL (ref 100–199)
CO2 SERPL-SCNC: 26 MMOL/L (ref 20–29)
COLOR UR: YELLOW
CREAT SERPL-MCNC: 0.93 MG/DL (ref 0.57–1)
EOSINOPHIL # BLD AUTO: 0.1 X10E3/UL (ref 0–0.4)
EOSINOPHIL NFR BLD AUTO: 1 %
ERYTHROCYTE [DISTWIDTH] IN BLOOD BY AUTOMATED COUNT: 12.7 % (ref 11.7–15.4)
EST. AVERAGE GLUCOSE BLD GHB EST-MCNC: 105 MG/DL
GLOBULIN SER CALC-MCNC: 2.8 G/DL (ref 1.5–4.5)
GLUCOSE SERPL-MCNC: 94 MG/DL (ref 65–99)
GLUCOSE UR QL: NEGATIVE
HBA1C MFR BLD: 5.3 % (ref 4.8–5.6)
HCT VFR BLD AUTO: 39.7 % (ref 34–46.6)
HDLC SERPL-MCNC: 65 MG/DL
HGB BLD-MCNC: 12.5 G/DL (ref 11.1–15.9)
HGB UR QL STRIP: NEGATIVE
IMM GRANULOCYTES # BLD AUTO: 0 X10E3/UL (ref 0–0.1)
IMM GRANULOCYTES NFR BLD AUTO: 0 %
INTERPRETATION, 910389: NORMAL
KETONES UR QL STRIP: NEGATIVE
LDLC SERPL CALC-MCNC: 110 MG/DL (ref 0–99)
LEUKOCYTE ESTERASE UR QL STRIP: NEGATIVE
LYMPHOCYTES # BLD AUTO: 3.1 X10E3/UL (ref 0.7–3.1)
LYMPHOCYTES NFR BLD AUTO: 45 %
MCH RBC QN AUTO: 30.2 PG (ref 26.6–33)
MCHC RBC AUTO-ENTMCNC: 31.5 G/DL (ref 31.5–35.7)
MCV RBC AUTO: 96 FL (ref 79–97)
MICRO URNS: NORMAL
MONOCYTES # BLD AUTO: 0.6 X10E3/UL (ref 0.1–0.9)
MONOCYTES NFR BLD AUTO: 9 %
NEUTROPHILS # BLD AUTO: 3.1 X10E3/UL (ref 1.4–7)
NEUTROPHILS NFR BLD AUTO: 45 %
NITRITE UR QL STRIP: NEGATIVE
PH UR STRIP: 5 [PH] (ref 5–7.5)
PLATELET # BLD AUTO: 295 X10E3/UL (ref 150–450)
POTASSIUM SERPL-SCNC: 4.2 MMOL/L (ref 3.5–5.2)
PROT SERPL-MCNC: 6.8 G/DL (ref 6–8.5)
PROT UR QL STRIP: NEGATIVE
RBC # BLD AUTO: 4.14 X10E6/UL (ref 3.77–5.28)
SODIUM SERPL-SCNC: 143 MMOL/L (ref 134–144)
SP GR UR: 1.02 (ref 1–1.03)
TRIGL SERPL-MCNC: 77 MG/DL (ref 0–149)
TSH SERPL DL<=0.005 MIU/L-ACNC: 3.07 UIU/ML (ref 0.45–4.5)
UROBILINOGEN UR STRIP-MCNC: 0.2 MG/DL (ref 0.2–1)
VLDLC SERPL CALC-MCNC: 15 MG/DL (ref 5–40)
WBC # BLD AUTO: 6.9 X10E3/UL (ref 3.4–10.8)

## 2020-08-20 ENCOUNTER — OFFICE VISIT (OUTPATIENT)
Dept: FAMILY MEDICINE CLINIC | Age: 60
End: 2020-08-20

## 2020-08-20 VITALS
HEIGHT: 68 IN | RESPIRATION RATE: 20 BRPM | WEIGHT: 208 LBS | HEART RATE: 71 BPM | DIASTOLIC BLOOD PRESSURE: 69 MMHG | SYSTOLIC BLOOD PRESSURE: 121 MMHG | BODY MASS INDEX: 31.52 KG/M2 | TEMPERATURE: 97.9 F | OXYGEN SATURATION: 100 %

## 2020-08-20 DIAGNOSIS — M79.7 FIBROMYALGIA: ICD-10-CM

## 2020-08-20 DIAGNOSIS — Z86.718 HISTORY OF DVT (DEEP VEIN THROMBOSIS): ICD-10-CM

## 2020-08-20 DIAGNOSIS — Z12.39 SCREENING FOR MALIGNANT NEOPLASM OF BREAST: Primary | ICD-10-CM

## 2020-08-20 DIAGNOSIS — N64.4 CHRONIC BREAST PAIN: ICD-10-CM

## 2020-08-20 DIAGNOSIS — E04.1 THYROID NODULE: ICD-10-CM

## 2020-08-20 DIAGNOSIS — G89.29 CHRONIC BREAST PAIN: ICD-10-CM

## 2020-09-03 ENCOUNTER — HOSPITAL ENCOUNTER (OUTPATIENT)
Dept: ULTRASOUND IMAGING | Age: 60
Discharge: HOME | End: 2020-09-03
Attending: INTERNAL MEDICINE
Payer: COMMERCIAL

## 2020-09-03 ENCOUNTER — HOSPITAL ENCOUNTER (OUTPATIENT)
Dept: MAMMOGRAPHY | Age: 60
Discharge: HOME | End: 2020-09-03
Attending: INTERNAL MEDICINE
Payer: COMMERCIAL

## 2020-09-03 DIAGNOSIS — E04.1 THYROID NODULE: ICD-10-CM

## 2020-09-03 DIAGNOSIS — Z12.39 SCREENING FOR MALIGNANT NEOPLASM OF BREAST: ICD-10-CM

## 2020-09-03 PROCEDURE — 77067 SCR MAMMO BI INCL CAD: CPT

## 2020-09-03 PROCEDURE — 76536 US EXAM OF HEAD AND NECK: CPT

## 2020-09-03 PROCEDURE — 77063 BREAST TOMOSYNTHESIS BI: CPT

## 2020-09-04 ENCOUNTER — TELEPHONE (OUTPATIENT)
Dept: FAMILY MEDICINE CLINIC | Age: 60
End: 2020-09-04

## 2020-09-04 DIAGNOSIS — E04.1 THYROID NODULE: Primary | ICD-10-CM

## 2020-09-04 NOTE — TELEPHONE ENCOUNTER
Informed patient that thyroid ultrasound showed a small (5 x 3 x 5 mm) right thyroid nodule. TSH normal. Recommend repeat thyroid US in one year. Patient agrees with plan.

## 2020-10-12 ENCOUNTER — VIRTUAL VISIT (OUTPATIENT)
Dept: FAMILY MEDICINE CLINIC | Age: 60
End: 2020-10-12
Payer: COMMERCIAL

## 2020-10-12 DIAGNOSIS — R20.0 RIGHT ARM NUMBNESS: ICD-10-CM

## 2020-10-12 DIAGNOSIS — F32.A ANXIETY AND DEPRESSION: ICD-10-CM

## 2020-10-12 DIAGNOSIS — H26.9 CATARACT OF BOTH EYES, UNSPECIFIED CATARACT TYPE: ICD-10-CM

## 2020-10-12 DIAGNOSIS — M79.601 RIGHT ARM PAIN: Primary | ICD-10-CM

## 2020-10-12 DIAGNOSIS — F41.9 ANXIETY AND DEPRESSION: ICD-10-CM

## 2020-10-12 DIAGNOSIS — Z86.718 HISTORY OF DVT (DEEP VEIN THROMBOSIS): ICD-10-CM

## 2020-10-12 PROCEDURE — 99214 OFFICE O/P EST MOD 30 MIN: CPT | Performed by: INTERNAL MEDICINE

## 2020-10-12 RX ORDER — VENLAFAXINE HYDROCHLORIDE 37.5 MG/1
37.5 CAPSULE, EXTENDED RELEASE ORAL DAILY
Qty: 60 CAP | Refills: 0 | Status: SHIPPED | OUTPATIENT
Start: 2020-10-12

## 2020-10-12 NOTE — PROGRESS NOTES
Valentin Burgess is a 62 y.o. female who was seen by synchronous (real-time) audio-video technology using doxy. me on 10/12/2020. Location of the patient: Home    Location of the provider: [facility]    Consent:  She and/or health care decision maker is aware that that she may receive a bill for this telehealth service, depending on her insurance coverage, and has provided verbal consent to proceed: Yes    Subjective:   Valentin Burgess is a 62 y.o. female who presents today for management of    Chief Complaint   Patient presents with    Arm Pain       Patient complains of right arm pain. It is associated with numbness, weakness and mild swelling. Onset was 6 months ago, gradually worsening since that time. She also reports of tremors on her right hand. She has history of right upper extremity DVT in 2018. She takes Eliquis. Patient complains of anxiety. Onset was 2 years ago since having a DVT. It had gradually getting worse. She also reports of feeling down and depressed. Problem List  Patient Active Problem List    Diagnosis Date Noted    Thyroid nodule 09/04/2020    History of DVT (deep vein thrombosis) 04/23/2020    Fibromyalgia 04/23/2020    Lumbar herniated disc 05/06/2016    Bipolar affect, depressed ([facility] 09/11/2013       Current Medications  Current Outpatient Medications   Medication Sig    venlafaxine-SR (EFFEXOR-XR) 37.5 mg capsule Take 1 Cap by mouth daily.  ondansetron (ZOFRAN ODT) 4 mg disintegrating tablet Take 1 Tab by mouth every eight (8) hours as needed for Nausea.  apixaban (ELIQUIS) 5 mg tablet Take 1 Tab by mouth two (2) times a day.  tiZANidine (ZANAFLEX) 4 mg tablet Take 1 Tab by mouth three (3) times daily as needed for Muscle Spasm(s). No current facility-administered medications for this visit.         Allergies/Drug Reactions  Allergies   Allergen Reactions    Biaxin [Clarithromycin] Rash    Codeine Itching    Gabapentin Drowsiness  Morphine Shortness of Breath and Itching     burning    Nubain [Nalbuphine] Itching        Social History  Social History     Tobacco Use    Smoking status: Never Smoker    Smokeless tobacco: Never Used   Substance Use Topics    Alcohol use: No    Drug use: Yes     Frequency: 35.0 times per week     Types: Marijuana        Review of Systems  Review of Systems   Constitutional: Negative for chills, fever, malaise/fatigue and weight loss. Eyes: Positive for blurred vision. Respiratory: Negative. Cardiovascular: Negative. Gastrointestinal: Negative. Musculoskeletal: Positive for myalgias and neck pain. Neurological: Negative. Psychiatric/Behavioral: Negative for depression and suicidal ideas. Objective:     General: alert, cooperative, no distress   Mental  status: mental status: alert, oriented to person, place, and time, normal mood, behavior, speech, dress, motor activity, and thought processes   Resp: resp: normal effort and no respiratory distress   Neuro: neuro: no gross deficits   Skin: skin: no discoloration or lesions of concern on visible areas     Due to this being a TeleHealth evaluation, many elements of the physical examination are unable to be assessed. Assessment & Plan:   1. Right arm pain, Right arm numbness  - check right arm ultrasound    2. Chest wall Pain  - APAP as needed    3. Cataract of both eyes, unspecified cataract type  - REFERRAL TO OPHTHALMOLOGY    4. History of DVT (deep vein thrombosis)  - continue Elqiuis  - advised to avoid NSAIDs    5. Depression and anxiety  - start venlafaxine    Follow-up and Dispositions    · Return in about 1 month (around 11/12/2020) for follow-up DOXY. We discussed the expected course, resolution and complications of the diagnosis(es) in detail. Medication risks, benefits, costs, interactions, and alternatives were discussed as indicated.   I advised her to contact the office if her condition worsens, changes or fails to improve as anticipated. She expressed understanding with the diagnosis(es) and plan. Pursuant to the emergency declaration under the [address] [place] [address], [facility] waiver authority and the [employer] and [employer] Act, this Virtual  Visit was conducted, with patient's consent, to reduce the patient's risk of exposure to COVID-19 and provide continuity of care for an established patient. Services were provided through a video synchronous discussion virtually to substitute for in-person clinic visit.     Wanda Rodriguez MD

## 2020-11-06 ENCOUNTER — HOSPITAL ENCOUNTER (OUTPATIENT)
Dept: VASCULAR SURGERY | Age: 60
Discharge: HOME | End: 2020-11-06
Attending: INTERNAL MEDICINE
Payer: COMMERCIAL

## 2020-11-06 DIAGNOSIS — Z86.718 HISTORY OF DVT (DEEP VEIN THROMBOSIS): ICD-10-CM

## 2020-11-06 DIAGNOSIS — M79.601 RIGHT UPPER LIMB PAIN: ICD-10-CM

## 2020-11-06 PROCEDURE — 93971 EXTREMITY STUDY: CPT

## 2020-11-09 ENCOUNTER — TELEPHONE (OUTPATIENT)
Dept: FAMILY MEDICINE CLINIC | Age: 60
End: 2020-11-09

## 2020-11-09 DIAGNOSIS — R20.0 RIGHT ARM NUMBNESS: ICD-10-CM

## 2020-11-09 DIAGNOSIS — M79.601 RIGHT ARM PAIN: Primary | ICD-10-CM

## 2020-11-09 NOTE — TELEPHONE ENCOUNTER
Informed patient her U/S duplex was negative. She verbalized understanding. Still complaining that arm pain can range from pain scale 5- 10 along with tremors. Please advise.

## 2020-11-09 NOTE — TELEPHONE ENCOUNTER
----- Message from Aren Sun MD sent at 11/9/2020 10:49 AM EST -----  Duplex negative for DVT. Please inform patient. Thank you.

## 2020-11-10 NOTE — TELEPHONE ENCOUNTER
Called and spoke with patient , verified x2 , informed her that referred her to neurology for further elevation of arm pain. patient verbal understanding

## 2020-12-08 ENCOUNTER — OFFICE VISIT (OUTPATIENT)
Dept: FAMILY MEDICINE CLINIC | Age: 60
End: 2020-12-08
Payer: COMMERCIAL

## 2020-12-08 VITALS
HEIGHT: 68 IN | RESPIRATION RATE: 20 BRPM | HEART RATE: 85 BPM | WEIGHT: 214 LBS | OXYGEN SATURATION: 99 % | SYSTOLIC BLOOD PRESSURE: 99 MMHG | DIASTOLIC BLOOD PRESSURE: 68 MMHG | BODY MASS INDEX: 32.43 KG/M2

## 2020-12-08 DIAGNOSIS — Z86.718 HISTORY OF DVT (DEEP VEIN THROMBOSIS): ICD-10-CM

## 2020-12-08 DIAGNOSIS — Z23 NEEDS FLU SHOT: ICD-10-CM

## 2020-12-08 DIAGNOSIS — Z01.818 PRE-OP EXAMINATION: Primary | ICD-10-CM

## 2020-12-08 DIAGNOSIS — H26.9 CATARACT OF BOTH EYES, UNSPECIFIED CATARACT TYPE: ICD-10-CM

## 2020-12-08 PROCEDURE — 99214 OFFICE O/P EST MOD 30 MIN: CPT | Performed by: INTERNAL MEDICINE

## 2020-12-08 PROCEDURE — 90471 IMMUNIZATION ADMIN: CPT | Performed by: INTERNAL MEDICINE

## 2020-12-08 PROCEDURE — 90686 IIV4 VACC NO PRSV 0.5 ML IM: CPT | Performed by: INTERNAL MEDICINE

## 2020-12-08 NOTE — PROGRESS NOTES
Preoperative Evaluation    Date of Exam: 12/08/20      Joselin Buckner  Is a 62 y.o.  female  who presents for preoperative evaluation and management of     Chief Complaint   Patient presents with    Pre-op Exam       History of Present Illness:  Procedure/Surgery:bilateral cataract surgery  Date of Procedure/Surgery: 12/17/2020 and 12/29/2020  Surgeon: Dr. Holden Keane:   Primary Physician: Liana Mcgarry MD  Latex Allergy: none    Exercise capacity: Patient does not exercise  Anesthesia Complications: None  History of abnormal bleeding : None  History of Blood Transfusions: no  Health Care Directive or Living Will: no    Recent use of: Eliquis    Tetanus up to date: tetanus status unknown to the patient      Past Medical History  Past Medical History:   Diagnosis Date    Arthritis     Bipolar 1 disorder ([facility]     Carpal tunnel syndrome     bother arms    DVT (deep venous thrombosis) ([facility]     RUE    Fibromyalgia     GERD (gastroesophageal reflux disease)     Hypercholesterolemia     IBS (irritable bowel syndrome)     Menopause     Sleep apnea     Uses CPAP    Slipped cervical disc         Surgical History  Past Surgical History:   Procedure Laterality Date    HX CYST REMOVAL      Left wrist    HX HYSTERECTOMY      uterus prolapse    HX ORTHOPAEDIC      Carpal tunnel    HX OTHER SURGICAL      cyst removal wrist and vagina    HX TUBAL LIGATION          Current Medications  Current Outpatient Medications   Medication Sig Dispense    Eliquis 5 mg tablet Take 1 tablet by mouth twice daily 60 Tab    venlafaxine-SR (EFFEXOR-XR) 37.5 mg capsule Take 1 Cap by mouth daily. 60 Cap    ondansetron (ZOFRAN ODT) 4 mg disintegrating tablet Take 1 Tab by mouth every eight (8) hours as needed for Nausea. 30 Tab    tiZANidine (ZANAFLEX) 4 mg tablet Take 1 Tab by mouth three (3) times daily as needed for Muscle Spasm(s).  120 Tab     No current facility-administered medications for this visit. Allergies/Drug Reactions  Allergies   Allergen Reactions    Biaxin [Clarithromycin] Rash    Codeine Itching    Gabapentin Drowsiness    Morphine Shortness of Breath and Itching     burning    Nubain [Nalbuphine] Itching        Family History  Family History   Problem Relation Age of Onset    Alcohol abuse Father         Social History  Social History     Tobacco Use    Smoking status: Never Smoker    Smokeless tobacco: Never Used   Substance Use Topics    Alcohol use: No    Drug use: Yes     Frequency: 35.0 times per week     Types: Marijuana        Review of Systems  Review of Systems   Constitutional: Negative for chills, fever, malaise/fatigue and weight loss. Eyes: Positive for blurred vision. Respiratory: Negative. Cardiovascular: Negative. Gastrointestinal: Negative. Musculoskeletal:        Positive for right arm pain   Neurological: Negative. Psychiatric/Behavioral: Negative. Physical Exam:  Vitals:    12/08/20 1035   BP: 99/68   Pulse: 85   Resp: 20   SpO2: 99%   Weight: 214 lb (97.1 kg)   Height: 5' 8\" (1.727 m)       General: alert, oriented, not in distress  Head: scalp normal, atraumatic  Eyes: pupils are equal and reactive, full and intact EOM's  Ears: patent ear canal, intact tympanic membrane  Nose: normal turbinates, no congestion or discharge  Lips/Mouth: moist lips and buccal mucosa, non-enlarged tonsils, pink throat  Neck: supple, no JVD, no lymphadenopathy, non-palpable thyroid  Chest/Lungs: clear breath sounds, no wheezing or crackles  Heart: normal rate, regular rhythm, no murmur  Extremities: no focal deformities, no edema  Skin: no active skin lesions    Impression:      ICD-10-CM ICD-9-CM    1. Pre-op examination  Z01.818 V72.84    2. Cataract of both eyes, unspecified cataract type  H26.9 366.9    3. History of DVT (deep vein thrombosis)  Z86.718 V12.51    4.  Needs flu shot  Z23 V04.81 INFLUENZA VIRUS VAC QUAD,SPLIT,PRESV FREE SYRINGE IM       No medical contraindication for planned surgery. I recommend discontinuing apixaban one day prior to surgery and restarting the medication after surgery.       Issac Workman MD  12/08/20

## 2020-12-08 NOTE — PROGRESS NOTES
Dian Wu presents today for   Chief Complaint   Patient presents with    Pre-op Exam       Is someone accompanying this pt? no    Is the patient using any DME equipment during OV? no    Depression Screening:  3 most recent PHQ Screens 3/12/2014   PHQ Not Done Active Diagnosis of Depression or Bipolar Disorder   Little interest or pleasure in doing things Nearly every day   Feeling down, depressed, irritable, or hopeless Nearly every day   Total Score PHQ 2 6       Learning Assessment:  Learning Assessment 2/22/2016   PRIMARY LEARNER Patient   HIGHEST LEVEL OF EDUCATION - PRIMARY LEARNER  SOME COLLEGE   BARRIERS PRIMARY LEARNER -   CO-LEARNER CAREGIVER -   PRIMARY LANGUAGE ENGLISH   LEARNER PREFERENCE PRIMARY LISTENING   ANSWERED BY patient   RELATIONSHIP SELF       Abuse Screening:  Abuse Screening Questionnaire 2/22/2016   Do you ever feel afraid of your partner? N   Are you in a relationship with someone who physically or mentally threatens you? N   Is it safe for you to go home? Y       Fall Risk  No flowsheet data found. Health Maintenance reviewed and discussed and ordered per Provider. Health Maintenance Due   Topic Date Due    Shingrix Vaccine Age 49> (1 of 2) 05/13/2011    Colorectal Cancer Screening Combo  09/03/2018    Flu Vaccine (1) 09/01/2020   . Coordination of Care:  1. Have you been to the ER, urgent care clinic since your last visit? Hospitalized since your last visit? no    2. Have you seen or consulted any other health care providers outside of the [address] [place] [address] since your last visit? Include any pap smears or colon screening. no      Last  Checked na  Last UDS Checked na  Last Pain contract signed: n      After obtaining consent, and per orders of Dr. Travon Angel, injection of Influenza 0.5ml given by Mary Santiago LPN. Patient instructed to remain in clinic for 20 minutes afterwards, and to report any adverse reaction to me immediately.

## 2021-02-22 ENCOUNTER — HOSPITAL ENCOUNTER (EMERGENCY)
Age: 61
Discharge: HOME | End: 2021-02-22
Attending: EMERGENCY MEDICINE
Payer: MEDICAID

## 2021-02-22 VITALS
HEIGHT: 68 IN | HEART RATE: 100 BPM | TEMPERATURE: 98.3 F | DIASTOLIC BLOOD PRESSURE: 67 MMHG | BODY MASS INDEX: 29.86 KG/M2 | RESPIRATION RATE: 18 BRPM | OXYGEN SATURATION: 100 % | WEIGHT: 197 LBS | SYSTOLIC BLOOD PRESSURE: 126 MMHG

## 2021-02-22 DIAGNOSIS — N30.90 CYSTITIS: Primary | ICD-10-CM

## 2021-02-22 DIAGNOSIS — N73.9 PELVIC INFLAMMATORY DISEASE: ICD-10-CM

## 2021-02-22 LAB
APPEARANCE UR: CLEAR
BACTERIA URNS QL MICRO: NEGATIVE /HPF
BILIRUB UR QL: ABNORMAL
COLOR UR: ABNORMAL
EPITH CASTS URNS QL MICRO: ABNORMAL /LPF (ref 0–5)
GLUCOSE UR STRIP.AUTO-MCNC: NEGATIVE MG/DL
HGB UR QL STRIP: ABNORMAL
KETONES UR QL STRIP.AUTO: 15 MG/DL
LEUKOCYTE ESTERASE UR QL STRIP.AUTO: NEGATIVE
MUCOUS THREADS URNS QL MICRO: ABNORMAL /LPF
NITRITE UR QL STRIP.AUTO: NEGATIVE
PH UR STRIP: 5 [PH] (ref 5–8)
PROT UR STRIP-MCNC: NEGATIVE MG/DL
RBC #/AREA URNS HPF: ABNORMAL /HPF (ref 0–5)
SERVICE CMNT-IMP: NORMAL
SP GR UR REFRACTOMETRY: >1.03 (ref 1–1.03)
UROBILINOGEN UR QL STRIP.AUTO: 1 EU/DL (ref 0.2–1)
WBC URNS QL MICRO: ABNORMAL /HPF (ref 0–4)
WET PREP GENITAL: NORMAL

## 2021-02-22 PROCEDURE — 87591 N.GONORRHOEAE DNA AMP PROB: CPT

## 2021-02-22 PROCEDURE — 74011250636 HC RX REV CODE- 250/636: Performed by: EMERGENCY MEDICINE

## 2021-02-22 PROCEDURE — 96372 THER/PROPH/DIAG INJ SC/IM: CPT

## 2021-02-22 PROCEDURE — 74011250637 HC RX REV CODE- 250/637: Performed by: EMERGENCY MEDICINE

## 2021-02-22 PROCEDURE — 99283 EMERGENCY DEPT VISIT LOW MDM: CPT

## 2021-02-22 PROCEDURE — 81001 URINALYSIS AUTO W/SCOPE: CPT

## 2021-02-22 PROCEDURE — 74011000250 HC RX REV CODE- 250: Performed by: EMERGENCY MEDICINE

## 2021-02-22 PROCEDURE — 87210 SMEAR WET MOUNT SALINE/INK: CPT

## 2021-02-22 RX ORDER — KETOROLAC TROMETHAMINE 30 MG/ML
30 INJECTION, SOLUTION INTRAMUSCULAR; INTRAVENOUS
Status: COMPLETED | OUTPATIENT
Start: 2021-02-22 | End: 2021-02-22

## 2021-02-22 RX ORDER — DOXYCYCLINE 100 MG/1
100 CAPSULE ORAL 2 TIMES DAILY
Qty: 14 CAP | Refills: 0 | Status: SHIPPED | OUTPATIENT
Start: 2021-02-22 | End: 2021-03-01

## 2021-02-22 RX ORDER — METRONIDAZOLE 250 MG/1
2000 TABLET ORAL
Status: COMPLETED | OUTPATIENT
Start: 2021-02-22 | End: 2021-02-22

## 2021-02-22 RX ORDER — DOXYCYCLINE 100 MG/1
100 CAPSULE ORAL
Status: COMPLETED | OUTPATIENT
Start: 2021-02-22 | End: 2021-02-22

## 2021-02-22 RX ADMIN — LIDOCAINE HYDROCHLORIDE 0.5 G: 10 INJECTION, SOLUTION EPIDURAL; INFILTRATION; INTRACAUDAL; PERINEURAL at 20:39

## 2021-02-22 RX ADMIN — DOXYCYCLINE 100 MG: 100 CAPSULE ORAL at 20:37

## 2021-02-22 RX ADMIN — KETOROLAC TROMETHAMINE 30 MG: 30 INJECTION, SOLUTION INTRAMUSCULAR at 18:56

## 2021-02-22 RX ADMIN — METRONIDAZOLE 2000 MG: 250 TABLET ORAL at 20:37

## 2021-02-22 NOTE — ED PROVIDER NOTES
EMERGENCY DEPARTMENT HISTORY AND PHYSICAL EXAM    6:34 PM      Date: 2/22/2021  Patient Name: David Cantu    History of Presenting Illness     Chief Complaint   Patient presents with    Pelvic Pain         History Provided By: patient    Additional History (Context): David Cantu is a 62 y.o. female presents with history of partial hysterectomy, only medication is Eliquis, reports 2 days of increasing suprapubic pain when her bladder becomes full, quique dysuria, foul-smelling urine and dark urine. No real vaginal discharge. She is sexually active and does want to be evaluated for STD. At rest her pain is fairly minimal but then comes back on as her bladder fills up. No back pain nausea vomiting fever. Kinsey Maloney PCP: Oziel Miramontes MD    Chief Complaint:   Duration:    Timing:    Location:   Quality:   Severity:   Modifying Factors:   Associated Symptoms:       Current Facility-Administered Medications   Medication Dose Route Frequency Provider Last Rate Last Admin    cefTRIAXone (ROCEPHIN) 0.5 g in lidocaine (PF) (XYLOCAINE) 10 mg/mL (1 %) IM injection  500 mg IntraMUSCular ONCE Brionna Flores MD        doxycycline (MONODOX) capsule 100 mg  100 mg Oral NOW Jhoan Rollins MD        metroNIDAZOLE (FLAGYL) tablet 2,000 mg  2,000 mg Oral NOW Jhoan Rollins MD         Current Outpatient Medications   Medication Sig Dispense Refill    doxycycline (MONODOX) 100 mg capsule Take 1 Cap by mouth two (2) times a day for 7 days. 14 Cap 0    Eliquis 5 mg tablet Take 1 tablet by mouth twice daily 60 Tab 5    venlafaxine-SR (EFFEXOR-XR) 37.5 mg capsule Take 1 Cap by mouth daily. 60 Cap 0    ondansetron (ZOFRAN ODT) 4 mg disintegrating tablet Take 1 Tab by mouth every eight (8) hours as needed for Nausea. 30 Tab 0    tiZANidine (ZANAFLEX) 4 mg tablet Take 1 Tab by mouth three (3) times daily as needed for Muscle Spasm(s).  120 Tab 0       Past History     Past Medical History:  Past Medical History:   Diagnosis Date    Arthritis     Bipolar 1 disorder ([facility] [address]     Carpal tunnel syndrome     bother arms    DVT (deep venous thrombosis) ([facility] [address] 2018    RUE    Fibromyalgia     GERD (gastroesophageal reflux disease)     Hypercholesterolemia     IBS (irritable bowel syndrome)     Menopause     Sleep apnea     Uses CPAP    Slipped cervical disc        Past Surgical History:  Past Surgical History:   Procedure Laterality Date    HX CYST REMOVAL      Left wrist    HX HYSTERECTOMY      uterus prolapse    HX ORTHOPAEDIC      Carpal tunnel    HX OTHER SURGICAL      cyst removal wrist and vagina    HX TUBAL LIGATION         Family History:  Family History   Problem Relation Age of Onset    Alcohol abuse Father        Social History:  Social History     Tobacco Use    Smoking status: Never Smoker    Smokeless tobacco: Never Used   Substance Use Topics    Alcohol use: No    Drug use: Yes     Frequency: 35.0 times per week     Types: Marijuana       Allergies: Allergies   Allergen Reactions    Biaxin [Clarithromycin] Rash    Codeine Itching    Gabapentin Drowsiness    Morphine Shortness of Breath and Itching     burning    Nubain [Nalbuphine] Itching         Review of Systems     Review of Systems   Constitutional: Negative for diaphoresis and fever. HENT: Negative for congestion and sore throat. Eyes: Negative for pain and itching. Respiratory: Negative for cough and shortness of breath. Cardiovascular: Negative for chest pain and palpitations. Gastrointestinal: Positive for abdominal pain. Negative for diarrhea. Endocrine: Negative for polydipsia and polyuria. Genitourinary: Positive for dysuria. Negative for hematuria. Musculoskeletal: Negative for arthralgias and myalgias. Skin: Negative for rash and wound. Neurological: Negative for seizures and syncope. Hematological: Does not bruise/bleed easily.    Psychiatric/Behavioral: Negative for agitation and hallucinations. Physical Exam       Patient Vitals for the past 12 hrs:   Temp Pulse Resp BP SpO2   02/22/21 1802 98.3 °F (36.8 °C) 100 18 126/67 100 %       Physical Exam  Vitals signs and nursing note reviewed. Constitutional:       General: She is not in acute distress. Appearance: She is well-developed. She is not ill-appearing. HENT:      Head: Normocephalic and atraumatic. Eyes:      General: No scleral icterus. Conjunctiva/sclera: Conjunctivae normal.   Neck:      Musculoskeletal: Normal range of motion and neck supple. Vascular: No JVD. Cardiovascular:      Rate and Rhythm: Normal rate and regular rhythm. Pulmonary:      Effort: Pulmonary effort is normal. No respiratory distress. Abdominal:      Tenderness: There is abdominal tenderness (Suprapubic pain). Musculoskeletal: Normal range of motion. Skin:     General: Skin is warm and dry. Neurological:      Mental Status: She is alert. Psychiatric:         Thought Content:  Thought content normal.         Judgment: Judgment normal.           Diagnostic Study Results   Labs -  Recent Results (from the past 12 hour(s))   URINALYSIS W/ RFLX MICROSCOPIC    Collection Time: 02/22/21  6:04 PM   Result Value Ref Range    Color DARK YELLOW      Appearance CLEAR      Specific gravity >1.030 (H) 1.005 - 1.030    pH (UA) 5.0 5.0 - 8.0      Protein Negative NEG mg/dL    Glucose Negative NEG mg/dL    Ketone 15 (A) NEG mg/dL    Bilirubin SMALL (A) NEG      Blood TRACE (A) NEG      Urobilinogen 1.0 0.2 - 1.0 EU/dL    Nitrites Negative NEG      Leukocyte Esterase Negative NEG     URINE MICROSCOPIC ONLY    Collection Time: 02/22/21  6:04 PM   Result Value Ref Range    WBC 0 to 3 0 - 4 /hpf    RBC 0 to 3 0 - 5 /hpf    Epithelial cells 2+ 0 - 5 /lpf    Bacteria Negative NEG /hpf    Mucus 2+ (A) NEG Pati Sabariana   WET PREP    Collection Time: 02/22/21  6:40 PM    Specimen: Vagina   Result Value Ref Range    Special Requests: NO SPECIAL REQUESTS Wet prep NO FUNGAL ELEMENTS SEEN      Wet prep NO TRICHOMONAS SEEN      Wet prep FEW  CLUE CELLS PRESENT           Radiologic Studies -   No orders to display     No results found. Medications ordered:   Medications   cefTRIAXone (ROCEPHIN) 0.5 g in lidocaine (PF) (XYLOCAINE) 10 mg/mL (1 %) IM injection (has no administration in time range)   doxycycline (MONODOX) capsule 100 mg (has no administration in time range)   metroNIDAZOLE (FLAGYL) tablet 2,000 mg (has no administration in time range)   ketorolac (TORADOL) injection 30 mg (30 mg IntraMUSCular Given 2/22/21 1856)         Medical Decision Making   Initial Medical Decision Making and DDx:     Not suspicious for tubo-ovarian abscess or ovarian torsion. She is quite tender directly over the pubic symphysis. Most of her symptoms correspond to the cystitis and urinary tract infection. Will check for STDs as well. Doubt ectopic pregnancy. ED Course: Progress Notes, Reevaluation, and Consults:     8:27 PM Pt reevaluated at this time. Discussed results and findings, as well as, diagnosis and plan for discharge. Follow up with doctors/services listed. Return to the emergency department for alarming symptoms. Pt verbalizes understanding and agreement with plan. All questions addressed. No alarming occurrences here in the ER laughing talking on the phone when I reassessed her, some clue cells will cover for BV. Remarkably the rest of her studies including the urine are nonactionable. But her degree of discomfort and cystitis symptoms were significant. Will cover for PID, she will get reassessed in 3 to 4 days with GYN. She is very happy with this plan. I am the first provider for this patient. I reviewed the vital signs, available nursing notes, past medical history, past surgical history, family history and social history.     Patient Vitals for the past 12 hrs:   Temp Pulse Resp BP SpO2   02/22/21 1802 98.3 °F (36.8 °C) 100 18 126/67 100 % Vital Signs-Reviewed the patient's vital signs. Pulse Oximetry Analysis, Cardiac Monitor, 12 lead ekg:       Interpreted by the EP. Records Reviewed: Nursing notes reviewed (Time of Review: 6:34 PM)    Procedures:   Critical Care Time:   Aspirin: (was aspirin given for stroke?)    Diagnosis     Clinical Impression:   1. Cystitis    2. Pelvic inflammatory disease        Disposition: Discharged      Follow-up Information     Follow up With Specialties Details Why Contact Info    Viji Corcoran MD Obstetrics & Gynecology, Gynecology, Obstetrics Call in 1 day  Thomas Greene  [phone]             Patient's Medications   Start Taking    DOXYCYCLINE (MONODOX) 100 MG CAPSULE    Take 1 Cap by mouth two (2) times a day for 7 days. Continue Taking    ELIQUIS 5 MG TABLET    Take 1 tablet by mouth twice daily    ONDANSETRON (ZOFRAN ODT) 4 MG DISINTEGRATING TABLET    Take 1 Tab by mouth every eight (8) hours as needed for Nausea. TIZANIDINE (ZANAFLEX) 4 MG TABLET    Take 1 Tab by mouth three (3) times daily as needed for Muscle Spasm(s). VENLAFAXINE-SR (EFFEXOR-XR) 37.5 MG CAPSULE    Take 1 Cap by mouth daily.    These Medications have changed    No medications on file   Stop Taking    No medications on file     _______________________________    Notes:    Juancho Queen MD using Dragon dictation      _______________________________

## 2021-02-22 NOTE — ED TRIAGE NOTES
Patient co lower abd pressure, pelvic pain, urinary pain. Says she is urinating more frequently and it is darker than usual. Patient does not know if exposed to STD. Denies vaginal bleeding or discharge.

## 2021-02-24 LAB
C TRACH RRNA SPEC QL NAA+PROBE: NEGATIVE
N GONORRHOEA RRNA SPEC QL NAA+PROBE: NEGATIVE
SPECIMEN SOURCE: NORMAL

## 2021-07-23 ENCOUNTER — OFFICE VISIT (OUTPATIENT)
Dept: FAMILY MEDICINE CLINIC | Age: 61
End: 2021-07-23
Payer: MEDICAID

## 2021-07-23 VITALS
RESPIRATION RATE: 20 BRPM | TEMPERATURE: 98 F | OXYGEN SATURATION: 97 % | WEIGHT: 219.6 LBS | BODY MASS INDEX: 33.39 KG/M2 | HEART RATE: 71 BPM | DIASTOLIC BLOOD PRESSURE: 60 MMHG | SYSTOLIC BLOOD PRESSURE: 132 MMHG

## 2021-07-23 DIAGNOSIS — R10.31 RIGHT GROIN PAIN: Primary | ICD-10-CM

## 2021-07-23 PROCEDURE — 99214 OFFICE O/P EST MOD 30 MIN: CPT | Performed by: STUDENT IN AN ORGANIZED HEALTH CARE EDUCATION/TRAINING PROGRAM

## 2021-07-23 RX ORDER — METHYLPREDNISOLONE 4 MG/1
4 TABLET ORAL
Qty: 1 DOSE PACK | Refills: 0 | Status: SHIPPED | OUTPATIENT
Start: 2021-07-23

## 2021-07-23 NOTE — PATIENT INSTRUCTIONS
Groin Strain: Care Instructions  Your Care Instructions  A groin strain is an injury that happens when you tear or overstretch (pull) a groin muscle. The groin muscles are in the area on either side of the body in the folds where the belly joins the legs. You can strain a groin muscle during exercise, such as running, skating, kicking in soccer, or playing basketball. It can happen when you lift, push, or pull heavy objects. You might pull a groin muscle when you fall. The injury can range from a minor pull to a more serious tear of the muscle. You may feel pain and tenderness that's worse when you squeeze your legs together. You may also have pain when you raise the knee of the injured side. There may be swelling or bruising in the groin area or inner thigh. If you have a bad strain, you may walk with a limp while it heals. Rest and other home care can help the muscle heal. Healing can take up to 3 weeks or more. Your doctor may want to see you again in 2 to 3 weeks. Follow-up care is a key part of your treatment and safety. Be sure to make and go to all appointments, and call your doctor if you are having problems. It's also a good idea to know your test results and keep a list of the medicines you take. How can you care for yourself at home? · Be safe with medicines. Read and follow all instructions on the label. ? If the doctor gave you a prescription medicine for pain, take it as prescribed. ? If you are not taking a prescription pain medicine, ask your doctor if you can take an over-the-counter medicine. · Rest and protect your injured or sore groin area for 1 to 2 weeks. Stop, change, or take a break from any activity that may be causing your pain or soreness. Do not do intense activities while you still have pain. · Put ice or a cold pack on your groin area for 10 to 20 minutes at a time. Try to do this every 1 to 2 hours for the next 3 days (when you are awake) or until the swelling goes down. Put a thin cloth between the ice and your skin. · After 2 or 3 days, if your swelling is gone, apply heat. Put a warm water bottle, a heating pad set on low, or a warm cloth on your groin area. Do not go to sleep with a heating pad on your skin. · If your doctor gave you crutches, make sure you use them as directed. · Wear snug shorts or underwear that support the injured area. When should you call for help? Call your doctor now or seek immediate medical care if:    · You have new or severe pain or swelling in the groin area.     · Your groin or upper thigh is cool or pale or changes color.     · You have tingling, weakness, or numbness in your groin or leg.     · You cannot move your leg.     · You cannot put weight on your leg. Watch closely for changes in your health, and be sure to contact your doctor if:    · You do not get better as expected. Where can you learn more? Go to http://www.gray.com/  Enter U924 in the search box to learn more about \"Groin Strain: Care Instructions. \"  Current as of: November 16, 2020               Content Version: 12.8  © 5041-1368 [employer]. Care instructions adapted under license by [employer] (which disclaims liability or warranty for this information). If you have questions about a medical condition or this instruction, always ask your healthcare professional. [place] [address] any warranty or liability for your use of this information.

## 2021-07-23 NOTE — PROGRESS NOTES
Nyasia Alaniz is a 62 y.o.  female and presents with    Chief Complaint   Patient presents with    Groin Pain           Subjective:    Patient of     For the last week or so, patient has been experiencing pain in the R groin area, shooting down to the leg, all the way to the foot. The growth pain is intermittent, however with movement it becomes unbearable pain. Patient states that she has become sexually active, and that she was having intercourse when she moves she felt something popped and the pain has worsened since then. States that there is also some back pain, that is radiating bilaterally however nothing is a significant does the groin pain that she is having. She did take acetaminophen, and has also tried using tight clothing in order to help with pain.           Patient Active Problem List   Diagnosis Code    Bipolar affect, depressed ([facility] [address] F31.30    Lumbar herniated disc M51.26    History of DVT (deep vein thrombosis) Z86.718    Fibromyalgia M79.7    Thyroid nodule E04.1      Past Medical History:   Diagnosis Date    Arthritis     Bipolar 1 disorder ([facility] [address]     Carpal tunnel syndrome     bother arms    DVT (deep venous thrombosis) ([facility] [address] 2018    RUE    Fibromyalgia     GERD (gastroesophageal reflux disease)     Hypercholesterolemia     IBS (irritable bowel syndrome)     Menopause     Sleep apnea     Uses CPAP    Slipped cervical disc       Past Surgical History:   Procedure Laterality Date    HX CYST REMOVAL      Left wrist    HX HYSTERECTOMY      uterus prolapse    HX ORTHOPAEDIC      Carpal tunnel    HX OTHER SURGICAL      cyst removal wrist and vagina    HX TUBAL LIGATION        Family History   Problem Relation Age of Onset    Alcohol abuse Father      Social History     Socioeconomic History    Marital status: SINGLE     Spouse name: Not on file    Number of children: Not on file    Years of education: Not on file    Highest education level: Not on file   Occupational History    Not on file   Tobacco Use    Smoking status: Never Smoker    Smokeless tobacco: Never Used   Substance and Sexual Activity    Alcohol use: No    Drug use: Yes     Frequency: 35.0 times per week     Types: Marijuana    Sexual activity: Not on file   Other Topics Concern    Not on file   Social History Narrative    Not on file     Social Determinants of Health     Financial Resource Strain:     Difficulty of Paying Living Expenses:    Food Insecurity:     Worried About Running Out of Food in the Last Year:     Ran Out of Food in the Last Year:    Transportation Needs:     Lack of Transportation (Medical):  Lack of Transportation (Non-Medical):    Physical Activity:     Days of Exercise per Week:     Minutes of Exercise per Session:    Stress:     Feeling of Stress :    Social Connections:     Frequency of Communication with Friends and Family:     Frequency of Social Gatherings with Friends and Family:     Attends Mosque Services:     Active Member of Clubs or Organizations:     Attends Club or Organization Meetings:     Marital Status:    Intimate Partner Violence:     Fear of Current or Ex-Partner:     Emotionally Abused:     Physically Abused:     Sexually Abused:         Current Outpatient Medications   Medication Sig Dispense Refill    Eliquis 5 mg tablet Take 1 tablet by mouth twice daily 180 Tablet 1    venlafaxine-SR (EFFEXOR-XR) 37.5 mg capsule Take 1 Cap by mouth daily. 60 Cap 0    ondansetron (ZOFRAN ODT) 4 mg disintegrating tablet Take 1 Tab by mouth every eight (8) hours as needed for Nausea. 30 Tab 0    tiZANidine (ZANAFLEX) 4 mg tablet Take 1 Tab by mouth three (3) times daily as needed for Muscle Spasm(s). 120 Tab 0        ROS   Review of Systems   Constitutional: Negative for chills, fever and malaise/fatigue. HENT: Negative for congestion, ear discharge and ear pain.     Eyes: Negative for blurred vision, pain and discharge. Respiratory: Negative for cough and shortness of breath. Cardiovascular: Negative for chest pain and palpitations. Gastrointestinal: Negative for abdominal pain, nausea and vomiting. Genitourinary: Negative for dysuria, frequency and urgency. Musculoskeletal: Positive for myalgias. Skin: Negative for itching and rash. Neurological: Negative for dizziness, seizures, loss of consciousness and headaches. Psychiatric/Behavioral: Negative for substance abuse. Objective:  Vitals:    07/23/21 1646   BP: 132/60   Pulse: 71   Resp: 20   Temp: 98 °F (36.7 °C)   SpO2: 97%   Weight: 219 lb 9.6 oz (99.6 kg)   PainSc:  10 - Worst pain ever   PainLoc: Groin       Physical Exam  Vitals reviewed. Constitutional:       Appearance: Normal appearance. Eyes:      General: No scleral icterus. Right eye: No discharge. Left eye: No discharge. Cardiovascular:      Rate and Rhythm: Normal rate and regular rhythm. Pulses: Normal pulses. Pulmonary:      Effort: Pulmonary effort is normal. No respiratory distress. Breath sounds: Normal breath sounds. Abdominal:      Tenderness: There is abdominal tenderness in the right lower quadrant. Hernia: No hernia is present. There is no hernia in the right femoral area. Musculoskeletal:         General: Tenderness (Right groin area) present. Cervical back: Normal range of motion and neck supple. Neurological:      Mental Status: She is alert and oriented to person, place, and time. Cranial Nerves: No cranial nerve deficit. Psychiatric:         Mood and Affect: Mood normal.         Behavior: Behavior normal.         Thought Content: Thought content normal.         Judgment: Judgment normal.           LABS     TESTS      Assessment/Plan:    1. Right groin pain  Will get US to r/o possible femoral hernia.   Patient is on anticoagulate should not, will give a Medrol pack in order to help with the inflammatory pain that the patient is having on the groin area. - US PELV NON OBS  LTD; Future  - methylPREDNISolone (MEDROL DOSEPACK) 4 mg tablet; Take 1 Tablet by mouth Specific Days and Specific Times. Dispense: 1 Dose Pack; Refill: 0    Lab review: no lab studies available for review at time of visit      I have discussed the diagnosis with the patient and the intended plan as seen in the above orders. The patient has received an after-visit summary and questions were answered concerning future plans. I have discussed medication side effects and warnings with the patient as well. I have reviewed the plan of care with the patient, accepted their input and they are in agreement with the treatment goals.          Med Matthews MD

## 2021-07-23 NOTE — PROGRESS NOTES
[facility] presents today for   Chief Complaint   Patient presents with    Groin Pain       Is someone accompanying this pt? no    Is the patient using any DME equipment during OV? no    Depression Screening:  3 most recent PHQ Screens 3/12/2014   PHQ Not Done Active Diagnosis of Depression or Bipolar Disorder   Little interest or pleasure in doing things Nearly every day   Feeling down, depressed, irritable, or hopeless Nearly every day   Total Score PHQ 2 6       Learning Assessment:  Learning Assessment 2/22/2016   PRIMARY LEARNER Patient   HIGHEST LEVEL OF EDUCATION - PRIMARY LEARNER  SOME COLLEGE   BARRIERS PRIMARY LEARNER -   CO-LEARNER CAREGIVER -   PRIMARY LANGUAGE ENGLISH   LEARNER PREFERENCE PRIMARY LISTENING   ANSWERED BY patient   RELATIONSHIP SELF       Abuse Screening:  Abuse Screening Questionnaire 2/22/2016   Do you ever feel afraid of your partner? N   Are you in a relationship with someone who physically or mentally threatens you? N   Is it safe for you to go home? Y       Fall Risk  No flowsheet data found. Health Maintenance reviewed and discussed and ordered per Provider. Health Maintenance Due   Topic Date Due    COVID-19 Vaccine (1) Never done    Shingrix Vaccine Age 50> (1 of 2) Never done    Colorectal Cancer Screening Combo  09/03/2018   . Coordination of Care:  1. Have you been to the ER, urgent care clinic since your last visit? Hospitalized since your last visit? no    2. Have you seen or consulted any other health care providers outside of the [address] [place] [address] since your last visit? Include any pap smears or colon screening.  no      Last  Checked na  Last UDS Checked na  Last Pain contract signed: na

## 2021-07-23 NOTE — Clinical Note
Possible hernia, I told her I would follow up with ultrasound, but then she is to follow up with you.

## 2021-10-06 ENCOUNTER — DOCUMENTATION ONLY (OUTPATIENT)
Dept: FAMILY MEDICINE CLINIC | Age: 61
End: 2021-10-06

## 2021-10-06 NOTE — PROGRESS NOTES
Patient needs dental procedure done. May discontinue apixaban 2 days prior to planned procedure (root canals, extraction, fillings). May resume after one day. Form completed and faxed to [facility].

## 2022-01-06 DIAGNOSIS — Z86.718 HISTORY OF DVT (DEEP VEIN THROMBOSIS): ICD-10-CM

## 2022-01-06 NOTE — TELEPHONE ENCOUNTER
Patient request a refill on the following prescription. Please advise. Requested Prescriptions     Pending Prescriptions Disp Refills    apixaban (Eliquis) 5 mg tablet 180 Tablet 1     Sig: Take 1 Tablet by mouth two (2) times a day.

## 2022-03-18 PROBLEM — E04.1 THYROID NODULE: Status: ACTIVE | Noted: 2020-09-04

## 2022-03-18 PROBLEM — Z86.718 HISTORY OF DVT (DEEP VEIN THROMBOSIS): Status: ACTIVE | Noted: 2020-04-23

## 2022-03-19 PROBLEM — M79.7 FIBROMYALGIA: Status: ACTIVE | Noted: 2020-04-23

## 2022-08-11 DIAGNOSIS — Z86.718 HISTORY OF DVT (DEEP VEIN THROMBOSIS): ICD-10-CM

## 2022-08-12 RX ORDER — APIXABAN 5 MG/1
TABLET, FILM COATED ORAL
Qty: 180 TABLET | Refills: 0 | Status: SHIPPED | OUTPATIENT
Start: 2022-08-12 | End: 2022-11-02

## 2022-11-01 DIAGNOSIS — Z86.718 HISTORY OF DVT (DEEP VEIN THROMBOSIS): ICD-10-CM

## 2022-11-02 RX ORDER — APIXABAN 5 MG/1
TABLET, FILM COATED ORAL
Qty: 180 TABLET | Refills: 0 | Status: SHIPPED | OUTPATIENT
Start: 2022-11-02

## 2023-03-10 RX ORDER — APIXABAN 5 MG/1
TABLET, FILM COATED ORAL
Qty: 180 TABLET | Refills: 0 | Status: SHIPPED | OUTPATIENT
Start: 2023-03-10

## 2023-05-30 DIAGNOSIS — Z86.718 HISTORY OF DVT (DEEP VEIN THROMBOSIS): Primary | ICD-10-CM

## 2023-06-01 RX ORDER — APIXABAN 5 MG/1
TABLET, FILM COATED ORAL
Qty: 180 TABLET | Refills: 0 | OUTPATIENT
Start: 2023-06-01

## 2023-07-08 DIAGNOSIS — Z86.718 HISTORY OF DVT (DEEP VEIN THROMBOSIS): ICD-10-CM

## 2023-07-08 RX ORDER — APIXABAN 5 MG/1
TABLET, FILM COATED ORAL
Qty: 60 TABLET | Refills: 5 | Status: SHIPPED | OUTPATIENT
Start: 2023-07-08 | End: 2023-09-06 | Stop reason: SDUPTHER

## 2023-09-06 ENCOUNTER — HOSPITAL ENCOUNTER (OUTPATIENT)
Facility: HOSPITAL | Age: 63
Setting detail: SPECIMEN
Discharge: HOME | End: 2023-09-09
Payer: COMMERCIAL

## 2023-09-06 ENCOUNTER — OFFICE VISIT (OUTPATIENT)
Facility: CLINIC | Age: 63
End: 2023-09-06
Payer: COMMERCIAL

## 2023-09-06 VITALS
OXYGEN SATURATION: 96 % | HEART RATE: 66 BPM | BODY MASS INDEX: 31.83 KG/M2 | WEIGHT: 210 LBS | HEIGHT: 68 IN | DIASTOLIC BLOOD PRESSURE: 80 MMHG | TEMPERATURE: 98.6 F | SYSTOLIC BLOOD PRESSURE: 118 MMHG | RESPIRATION RATE: 17 BRPM

## 2023-09-06 DIAGNOSIS — K21.9 GASTROESOPHAGEAL REFLUX DISEASE WITHOUT ESOPHAGITIS: ICD-10-CM

## 2023-09-06 DIAGNOSIS — M54.50 CHRONIC MIDLINE LOW BACK PAIN WITHOUT SCIATICA: ICD-10-CM

## 2023-09-06 DIAGNOSIS — Z12.31 ENCOUNTER FOR SCREENING MAMMOGRAM FOR MALIGNANT NEOPLASM OF BREAST: ICD-10-CM

## 2023-09-06 DIAGNOSIS — E66.09 CLASS 1 OBESITY DUE TO EXCESS CALORIES WITHOUT SERIOUS COMORBIDITY WITH BODY MASS INDEX (BMI) OF 31.0 TO 31.9 IN ADULT: ICD-10-CM

## 2023-09-06 DIAGNOSIS — N39.46 MIXED STRESS AND URGE URINARY INCONTINENCE: ICD-10-CM

## 2023-09-06 DIAGNOSIS — Z12.11 COLON CANCER SCREENING: ICD-10-CM

## 2023-09-06 DIAGNOSIS — M51.26 LUMBAR HERNIATED DISC: ICD-10-CM

## 2023-09-06 DIAGNOSIS — E55.9 VITAMIN D DEFICIENCY: ICD-10-CM

## 2023-09-06 DIAGNOSIS — Z86.718 HISTORY OF DVT (DEEP VEIN THROMBOSIS): ICD-10-CM

## 2023-09-06 DIAGNOSIS — M79.7 FIBROMYALGIA: ICD-10-CM

## 2023-09-06 DIAGNOSIS — E04.1 THYROID NODULE: ICD-10-CM

## 2023-09-06 DIAGNOSIS — R11.2 NAUSEA AND VOMITING, UNSPECIFIED VOMITING TYPE: ICD-10-CM

## 2023-09-06 DIAGNOSIS — Z11.59 ENCOUNTER FOR HEPATITIS C SCREENING TEST FOR LOW RISK PATIENT: ICD-10-CM

## 2023-09-06 DIAGNOSIS — G89.4 CHRONIC PAIN SYNDROME: ICD-10-CM

## 2023-09-06 DIAGNOSIS — G56.03 BILATERAL CARPAL TUNNEL SYNDROME: ICD-10-CM

## 2023-09-06 DIAGNOSIS — M79.642 BILATERAL HAND PAIN: ICD-10-CM

## 2023-09-06 DIAGNOSIS — M79.641 BILATERAL HAND PAIN: ICD-10-CM

## 2023-09-06 DIAGNOSIS — G89.29 CHRONIC MIDLINE LOW BACK PAIN WITHOUT SCIATICA: ICD-10-CM

## 2023-09-06 DIAGNOSIS — Z76.89 ENCOUNTER TO ESTABLISH CARE: Primary | ICD-10-CM

## 2023-09-06 LAB
25(OH)D3 SERPL-MCNC: 17.7 NG/ML (ref 30–100)
ALBUMIN SERPL-MCNC: 3.6 G/DL (ref 3.4–5)
ALBUMIN/GLOB SERPL: 0.9 (ref 0.8–1.7)
ALP SERPL-CCNC: 78 U/L (ref 45–117)
ALT SERPL-CCNC: 18 U/L (ref 13–56)
ANION GAP SERPL CALC-SCNC: 5 MMOL/L (ref 3–18)
APPEARANCE UR: CLEAR
AST SERPL-CCNC: 12 U/L (ref 10–38)
BACTERIA URNS QL MICRO: NEGATIVE /HPF
BASOPHILS # BLD: 0 K/UL (ref 0–0.1)
BASOPHILS NFR BLD: 0 % (ref 0–2)
BILIRUB SERPL-MCNC: 0.4 MG/DL (ref 0.2–1)
BILIRUB UR QL: NEGATIVE
BUN SERPL-MCNC: 14 MG/DL (ref 7–18)
BUN/CREAT SERPL: 12 (ref 12–20)
CALCIUM SERPL-MCNC: 9.3 MG/DL (ref 8.5–10.1)
CHLORIDE SERPL-SCNC: 107 MMOL/L (ref 100–111)
CHOLEST SERPL-MCNC: 213 MG/DL
CO2 SERPL-SCNC: 29 MMOL/L (ref 21–32)
COLOR UR: YELLOW
CREAT SERPL-MCNC: 1.13 MG/DL (ref 0.6–1.3)
DIFFERENTIAL METHOD BLD: ABNORMAL
EOSINOPHIL # BLD: 0.1 K/UL (ref 0–0.4)
EOSINOPHIL NFR BLD: 1 % (ref 0–5)
EPITH CASTS URNS QL MICRO: ABNORMAL /LPF (ref 0–5)
ERYTHROCYTE [DISTWIDTH] IN BLOOD BY AUTOMATED COUNT: 12.9 % (ref 11.6–14.5)
GLOBULIN SER CALC-MCNC: 4 G/DL (ref 2–4)
GLUCOSE SERPL-MCNC: 84 MG/DL (ref 74–99)
GLUCOSE UR STRIP.AUTO-MCNC: NEGATIVE MG/DL
HBA1C MFR BLD: 5 % (ref 4.2–5.6)
HCT VFR BLD AUTO: 37.8 % (ref 35–45)
HDLC SERPL-MCNC: 72 MG/DL (ref 40–60)
HDLC SERPL: 3 (ref 0–5)
HGB BLD-MCNC: 12 G/DL (ref 12–16)
HGB UR QL STRIP: NEGATIVE
IMM GRANULOCYTES # BLD AUTO: 0 K/UL (ref 0–0.04)
IMM GRANULOCYTES NFR BLD AUTO: 0 % (ref 0–0.5)
KETONES UR QL STRIP.AUTO: NEGATIVE MG/DL
LDLC SERPL CALC-MCNC: 125 MG/DL (ref 0–100)
LEUKOCYTE ESTERASE UR QL STRIP.AUTO: NEGATIVE
LIPID PANEL: ABNORMAL
LYMPHOCYTES # BLD: 3.2 K/UL (ref 0.9–3.6)
LYMPHOCYTES NFR BLD: 44 % (ref 21–52)
MCH RBC QN AUTO: 30.7 PG (ref 24–34)
MCHC RBC AUTO-ENTMCNC: 31.7 G/DL (ref 31–37)
MCV RBC AUTO: 96.7 FL (ref 78–100)
MONOCYTES # BLD: 0.6 K/UL (ref 0.05–1.2)
MONOCYTES NFR BLD: 8 % (ref 3–10)
MUCOUS THREADS URNS QL MICRO: ABNORMAL /LPF
NEUTS SEG # BLD: 3.5 K/UL (ref 1.8–8)
NEUTS SEG NFR BLD: 47 % (ref 40–73)
NITRITE UR QL STRIP.AUTO: NEGATIVE
NRBC # BLD: 0 K/UL (ref 0–0.01)
NRBC BLD-RTO: 0 PER 100 WBC
PH UR STRIP: 6 (ref 5–8)
PLATELET # BLD AUTO: 289 K/UL (ref 135–420)
PMV BLD AUTO: 9.4 FL (ref 9.2–11.8)
POTASSIUM SERPL-SCNC: 4.4 MMOL/L (ref 3.5–5.5)
PROT SERPL-MCNC: 7.6 G/DL (ref 6.4–8.2)
PROT UR STRIP-MCNC: NEGATIVE MG/DL
RBC # BLD AUTO: 3.91 M/UL (ref 4.2–5.3)
RBC #/AREA URNS HPF: ABNORMAL /HPF (ref 0–5)
SODIUM SERPL-SCNC: 141 MMOL/L (ref 136–145)
SP GR UR REFRACTOMETRY: 1.02 (ref 1–1.03)
T4 FREE SERPL-MCNC: 0.9 NG/DL (ref 0.7–1.5)
TRIGL SERPL-MCNC: 80 MG/DL
TSH SERPL DL<=0.05 MIU/L-ACNC: 1.38 UIU/ML (ref 0.36–3.74)
UROBILINOGEN UR QL STRIP.AUTO: 0.2 EU/DL (ref 0.2–1)
VLDLC SERPL CALC-MCNC: 16 MG/DL
WBC # BLD AUTO: 7.3 K/UL (ref 4.6–13.2)
WBC URNS QL MICRO: ABNORMAL /HPF (ref 0–4)

## 2023-09-06 PROCEDURE — 81001 URINALYSIS AUTO W/SCOPE: CPT

## 2023-09-06 PROCEDURE — 83036 HEMOGLOBIN GLYCOSYLATED A1C: CPT

## 2023-09-06 PROCEDURE — 84439 ASSAY OF FREE THYROXINE: CPT

## 2023-09-06 PROCEDURE — 36415 COLL VENOUS BLD VENIPUNCTURE: CPT

## 2023-09-06 PROCEDURE — 80061 LIPID PANEL: CPT

## 2023-09-06 PROCEDURE — 80053 COMPREHEN METABOLIC PANEL: CPT

## 2023-09-06 PROCEDURE — 84443 ASSAY THYROID STIM HORMONE: CPT

## 2023-09-06 PROCEDURE — 86803 HEPATITIS C AB TEST: CPT

## 2023-09-06 PROCEDURE — 99214 OFFICE O/P EST MOD 30 MIN: CPT | Performed by: STUDENT IN AN ORGANIZED HEALTH CARE EDUCATION/TRAINING PROGRAM

## 2023-09-06 PROCEDURE — 85025 COMPLETE CBC W/AUTO DIFF WBC: CPT

## 2023-09-06 PROCEDURE — 82306 VITAMIN D 25 HYDROXY: CPT

## 2023-09-06 RX ORDER — OMEPRAZOLE 40 MG/1
40 CAPSULE, DELAYED RELEASE ORAL
Qty: 90 CAPSULE | Refills: 1 | Status: SHIPPED | OUTPATIENT
Start: 2023-09-06 | End: 2023-09-06 | Stop reason: SDUPTHER

## 2023-09-06 RX ORDER — OMEPRAZOLE 40 MG/1
40 CAPSULE, DELAYED RELEASE ORAL
Qty: 90 CAPSULE | Refills: 1 | Status: SHIPPED | OUTPATIENT
Start: 2023-09-06

## 2023-09-06 SDOH — ECONOMIC STABILITY: FOOD INSECURITY: WITHIN THE PAST 12 MONTHS, THE FOOD YOU BOUGHT JUST DIDN'T LAST AND YOU DIDN'T HAVE MONEY TO GET MORE.: NEVER TRUE

## 2023-09-06 SDOH — ECONOMIC STABILITY: HOUSING INSECURITY
IN THE LAST 12 MONTHS, WAS THERE A TIME WHEN YOU DID NOT HAVE A STEADY PLACE TO SLEEP OR SLEPT IN A SHELTER (INCLUDING NOW)?: NO

## 2023-09-06 SDOH — ECONOMIC STABILITY: FOOD INSECURITY: WITHIN THE PAST 12 MONTHS, YOU WORRIED THAT YOUR FOOD WOULD RUN OUT BEFORE YOU GOT MONEY TO BUY MORE.: NEVER TRUE

## 2023-09-06 SDOH — ECONOMIC STABILITY: INCOME INSECURITY: HOW HARD IS IT FOR YOU TO PAY FOR THE VERY BASICS LIKE FOOD, HOUSING, MEDICAL CARE, AND HEATING?: NOT HARD AT ALL

## 2023-09-06 ASSESSMENT — ENCOUNTER SYMPTOMS
ABDOMINAL PAIN: 0
BACK PAIN: 1
SHORTNESS OF BREATH: 0

## 2023-09-06 ASSESSMENT — PATIENT HEALTH QUESTIONNAIRE - PHQ9
SUM OF ALL RESPONSES TO PHQ QUESTIONS 1-9: 2
2. FEELING DOWN, DEPRESSED OR HOPELESS: 1
SUM OF ALL RESPONSES TO PHQ9 QUESTIONS 1 & 2: 2
SUM OF ALL RESPONSES TO PHQ QUESTIONS 1-9: 2
1. LITTLE INTEREST OR PLEASURE IN DOING THINGS: 1

## 2023-09-06 NOTE — PROGRESS NOTES
History of Present Illness  Evens Martinez is a 59 y.o. female who presents today for management of    Chief Complaint   Patient presents with    Establish Care     Patient here to establish care with provider      CC: new patient here for healthcare maintenance    -Patient is here to establish care. -Previous PCP: Dr Daryl English , last visit 2021    -Not working  -She lives at home with daughter and 2 grandchildren   -Pt reports good support system with family/friends and feels safe at home. Pt has longstanding hx of chronic pain including neck, back, shoulders and hips. Previously told she has fibromyalgia. Pain is moderate in severity. Previous therapy: physical therapy, opioids, gabapentin, muscle relaxants, Cymbalta, amitriptyline. She reports that only opioids were effective, but she had GI side effects from them and would like to avoid them as much as possible. Pt also notices pain and swelling in hands and associated numbness/tingling intermittently. Previously have carpal tunnel release on left, but not on right. Pt also has hx of bothersome nausea and intermittent vomiting. Does not affect appetite, and she has not noticed a change in weight. Reflux on and off. Not currently on medication. Pt has significant urinary incontinence, does not make it to the bathroom in time. Reports that most days when she goes to stand if she has the urge to urinary she will have a loss of urine upon standing. This has never been evaluated.      Healthcare maintenance:  Immunizations:  Flu vacc: overdue   TDaP vacc: UTD  Shingles: overdue  COVID vacc: UTD  Last Pap:  s/p hysterectomy   Last Mammo: overdue  Last Colonoscopy: overdue, last done 9/3/2013  Sexual health: Pt declines STI screening  Mood: Reports stable mood overall  PHQ-9  9/6/2023   Little interest or pleasure in doing things 1   Feeling down, depressed, or hopeless 1   PHQ-2 Score 2   PHQ-9 Total Score 2     Past Medical History  Past Medical History:

## 2023-09-06 NOTE — PROGRESS NOTES
Yayo Christie is a 59 y.o. presents today for   Chief Complaint   Patient presents with    Establish Care     Patient here to establish care with provider        Is someone accompanying this pt? no    Is the patient using any DME equipment during OV? no    Depression Screening:   PHQ-9 Questionaire 9/6/2023   Little interest or pleasure in doing things 1   Feeling down, depressed, or hopeless 1   PHQ-9 Total Score 2       Abuse Screening:  No flowsheet data found. Learning Assessment:  No question data found. Fall Risk:  No flowsheet data found. Coordination of Care:   1. \"Have you been to the ER, urgent care clinic since your last visit? Hospitalized since your last visit? \" no    2. \"Have you seen or consulted any other health care providers outside of the [address] [place] [address] since your last visit? \" no    3. For patients aged 43-73: Has the patient had a colonoscopy / FIT/ Cologuard? Yes     If the patient is female:    4. For patients aged 43-66: Has the patient had a mammogram within the past 2 years? no    5. For patients aged 21-65: Has the patient had a pap smear? No     Health Maintenance: reviewed and discussed and ordered per Provider.     Health Maintenance Due   Topic Date Due    COVID-19 Vaccine (1) Never done    Depression Monitoring  Never done    HIV screen  Never done    Hepatitis C screen  Never done    Shingles vaccine (1 of 2) Never done    Colorectal Cancer Screen  09/03/2018    Breast cancer screen  09/03/2022    Flu vaccine (1) 08/01/2023

## 2023-09-07 LAB
HCV AB SER IA-ACNC: 0.16 INDEX
HCV AB SERPL QL IA: NEGATIVE
HEPATITIS C COMMENT: NORMAL

## 2023-10-12 ENCOUNTER — OFFICE VISIT (OUTPATIENT)
Facility: CLINIC | Age: 63
End: 2023-10-12
Payer: COMMERCIAL

## 2023-10-12 VITALS
OXYGEN SATURATION: 96 % | BODY MASS INDEX: 32.13 KG/M2 | RESPIRATION RATE: 16 BRPM | WEIGHT: 212 LBS | TEMPERATURE: 97.4 F | SYSTOLIC BLOOD PRESSURE: 114 MMHG | HEIGHT: 68 IN | DIASTOLIC BLOOD PRESSURE: 75 MMHG | HEART RATE: 74 BPM

## 2023-10-12 DIAGNOSIS — Z78.0 POSTMENOPAUSAL: Primary | ICD-10-CM

## 2023-10-12 DIAGNOSIS — H93.8X3 EAR FULLNESS, BILATERAL: ICD-10-CM

## 2023-10-12 DIAGNOSIS — Z91.89 EXCESSIVE CONSUMPTION OF COFFEE: ICD-10-CM

## 2023-10-12 DIAGNOSIS — J34.89 SINUS PRESSURE: ICD-10-CM

## 2023-10-12 DIAGNOSIS — Z87.39 HISTORY OF GOUT: ICD-10-CM

## 2023-10-12 DIAGNOSIS — Z23 IMMUNIZATION DUE: ICD-10-CM

## 2023-10-12 DIAGNOSIS — R09.89 THROAT FULLNESS: ICD-10-CM

## 2023-10-12 DIAGNOSIS — Z87.39 HISTORY OF FIBROMYALGIA: ICD-10-CM

## 2023-10-12 PROCEDURE — 99214 OFFICE O/P EST MOD 30 MIN: CPT | Performed by: NURSE PRACTITIONER

## 2023-10-12 PROCEDURE — 90674 CCIIV4 VAC NO PRSV 0.5 ML IM: CPT | Performed by: NURSE PRACTITIONER

## 2023-10-12 PROCEDURE — 90471 IMMUNIZATION ADMIN: CPT | Performed by: NURSE PRACTITIONER

## 2023-10-12 RX ORDER — CETIRIZINE HYDROCHLORIDE 10 MG/1
10 TABLET ORAL DAILY
Qty: 30 TABLET | Refills: 2 | Status: SHIPPED | OUTPATIENT
Start: 2023-10-12 | End: 2024-01-10

## 2023-10-12 RX ORDER — ACETAMINOPHEN 160 MG
1 TABLET,DISINTEGRATING ORAL DAILY
Qty: 180 CAPSULE | Refills: 3 | Status: SHIPPED | OUTPATIENT
Start: 2023-10-12

## 2023-10-12 RX ORDER — PHENOL 1.4 %
1 AEROSOL, SPRAY (ML) MUCOUS MEMBRANE 2 TIMES DAILY
Qty: 30 TABLET | Refills: 3 | Status: SHIPPED | OUTPATIENT
Start: 2023-10-12

## 2023-10-12 NOTE — PROGRESS NOTES
Evens Martinez is a 59 y.o. female  established patient, here for evaluation of the following chief complaint(s):  Chief Complaint   Patient presents with    Discuss Labs    Hip Pain    Back Pain    Dental Pain    Spasms     On Left side of face       Assessment and Plan  1. Postmenopausal  -     Cholecalciferol (VITAMIN D3) 50 MCG (2000 UT) CAPS; Take 1 capsule by mouth daily, Disp-180 capsule, R-3Normal  -     calcium carbonate (CALCIUM 600) 600 MG TABS tablet; Take 1 tablet by mouth in the morning and at bedtime, Disp-30 tablet, R-3Normal  2. History of gout  3. History of fibromyalgia  4. Excessive consumption of coffee  5. Throat fullness  -     US THYROID; Future  -     External Referral To ENT  6. Ear fullness, bilateral  -     External Referral To ENT  -     cetirizine (ZYRTEC) 10 MG tablet; Take 1 tablet by mouth daily, Disp-30 tablet, R-2Normal  7. Sinus pressure  -     External Referral To ENT  -     cetirizine (ZYRTEC) 10 MG tablet; Take 1 tablet by mouth daily, Disp-30 tablet, R-2Normal     Return in about 6 days (around 10/18/2023) for Follow-up with your PCP. HPI:   In office visit. Pt appears well and in office w/ her daughter. 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 She is on disability related to fibromyalgia and mental health, bipolar.    -Pt feels she has throat fullness for 5-6 mos and hard to swallow sensation   -pt feels she gets veritgo at times, where she feels she is spinning, intermittent. She has sinus pressure and ear fullness   -Pt does not smoke tobacco but does marijuana for pain relief and no alcohol   -Pt has chronic pain of multiple joints related to arthritis and has an appt w/ physical therapy   -Pt on Eliquis related to a \"vein problem\" 2018 because of a medication injected into her arm that caused a multiple \"clots. \" Pt told she will need to be on Eliquis the rest of her life.  -Pt's recent GFR 55.  Pt admits to drinking a lot of coffee daily

## 2023-10-16 DIAGNOSIS — M54.50 CHRONIC MIDLINE LOW BACK PAIN WITHOUT SCIATICA: Primary | ICD-10-CM

## 2023-10-16 DIAGNOSIS — M79.7 FIBROMYALGIA: ICD-10-CM

## 2023-10-16 DIAGNOSIS — G89.4 CHRONIC PAIN SYNDROME: ICD-10-CM

## 2023-10-16 DIAGNOSIS — G89.29 CHRONIC MIDLINE LOW BACK PAIN WITHOUT SCIATICA: Primary | ICD-10-CM

## 2023-10-16 DIAGNOSIS — M51.26 LUMBAR HERNIATED DISC: ICD-10-CM

## 2023-10-31 ENCOUNTER — PATIENT MESSAGE (OUTPATIENT)
Facility: CLINIC | Age: 63
End: 2023-10-31

## 2024-01-05 DIAGNOSIS — Z78.0 POSTMENOPAUSAL: ICD-10-CM

## 2024-01-05 RX ORDER — PHENOL 1.4 %
1 AEROSOL, SPRAY (ML) MUCOUS MEMBRANE 2 TIMES DAILY
Qty: 30 TABLET | Refills: 3 | Status: SHIPPED | OUTPATIENT
Start: 2024-01-05

## 2024-01-05 NOTE — TELEPHONE ENCOUNTER
This patient contacted the office for the following prescriptions to be refilled:    Medication requested :   Requested Prescriptions     Pending Prescriptions Disp Refills    calcium carbonate (CALCIUM 600) 600 MG TABS tablet 30 tablet 3     Sig: Take 1 tablet by mouth in the morning and at bedtime      PCP: Abimbola Mckeon MD  LOV:           10/2023 an IN OFFICE  NOV DMA: 1/17/2024  FUTURE APPT:   Future Appointments   Date Time Provider Department Center   1/17/2024 10:45 AM Abimbola Mckeon MD [facility]         Thank you.

## 2024-01-10 DIAGNOSIS — Z78.0 POSTMENOPAUSAL: ICD-10-CM

## 2024-01-10 RX ORDER — PHENOL 1.4 %
1 AEROSOL, SPRAY (ML) MUCOUS MEMBRANE 2 TIMES DAILY
Qty: 30 TABLET | Refills: 3 | Status: SHIPPED | OUTPATIENT
Start: 2024-01-10

## 2024-01-17 DIAGNOSIS — Z78.0 POSTMENOPAUSAL: ICD-10-CM

## 2024-01-17 RX ORDER — PHENOL 1.4 %
1 AEROSOL, SPRAY (ML) MUCOUS MEMBRANE 2 TIMES DAILY
Qty: 90 TABLET | Refills: 1 | Status: SHIPPED | OUTPATIENT
Start: 2024-01-17

## 2024-01-17 NOTE — TELEPHONE ENCOUNTER
Medication(s) requesting:   Requested Prescriptions     Pending Prescriptions Disp Refills    calcium carbonate (CALCIUM 600) 600 MG TABS tablet 90 tablet 1     Sig: Take 1 tablet by mouth in the morning and at bedtime       Last office visit:  10/12/2023  Next office visit DMA: 1/17/2024  FUTURE APPT:   Future Appointments   Date Time Provider Department Center   1/17/2024 10:45 AM Abimbola Mckeon MD [facility]

## 2024-01-22 DIAGNOSIS — Z78.0 POSTMENOPAUSAL: ICD-10-CM

## 2024-01-22 RX ORDER — PHENOL 1.4 %
1 AEROSOL, SPRAY (ML) MUCOUS MEMBRANE 2 TIMES DAILY
Qty: 90 TABLET | Refills: 1 | OUTPATIENT
Start: 2024-01-22

## 2024-01-22 NOTE — TELEPHONE ENCOUNTER
Medication(s) requesting:   Requested Prescriptions     Pending Prescriptions Disp Refills    calcium carbonate (CALCIUM 600) 600 MG TABS tablet 90 tablet 1     Sig: Take 1 tablet by mouth in the morning and at bedtime       Last office visit:  10/12/2023  Next office visit DMA: Visit date not found  FUTURE APPT: No future appointments.

## 2024-04-21 DIAGNOSIS — Z86.718 HISTORY OF DVT (DEEP VEIN THROMBOSIS): ICD-10-CM

## 2024-04-22 RX ORDER — APIXABAN 5 MG/1
5 TABLET, FILM COATED ORAL 2 TIMES DAILY
Qty: 180 TABLET | Refills: 2 | Status: SHIPPED | OUTPATIENT
Start: 2024-04-22

## 2024-05-28 ENCOUNTER — TELEPHONE (OUTPATIENT)
Facility: CLINIC | Age: 64
End: 2024-05-28

## 2024-05-28 NOTE — TELEPHONE ENCOUNTER
Pt called through the triage line stating that she has sob at night that makes her wake up in the middle of the night and loud snore. Schedule pt on 06/13/24 to see Dr Mckeon.

## 2024-06-13 ENCOUNTER — OFFICE VISIT (OUTPATIENT)
Facility: CLINIC | Age: 64
End: 2024-06-13
Payer: COMMERCIAL

## 2024-06-13 ENCOUNTER — HOSPITAL ENCOUNTER (OUTPATIENT)
Facility: HOSPITAL | Age: 64
Setting detail: SPECIMEN
Discharge: HOME | End: 2024-06-13
Payer: COMMERCIAL

## 2024-06-13 VITALS
BODY MASS INDEX: 34.4 KG/M2 | RESPIRATION RATE: 15 BRPM | HEIGHT: 68 IN | WEIGHT: 227 LBS | DIASTOLIC BLOOD PRESSURE: 83 MMHG | OXYGEN SATURATION: 98 % | HEART RATE: 97 BPM | TEMPERATURE: 97.7 F | SYSTOLIC BLOOD PRESSURE: 118 MMHG

## 2024-06-13 DIAGNOSIS — G89.4 CHRONIC PAIN SYNDROME: ICD-10-CM

## 2024-06-13 DIAGNOSIS — M51.26 LUMBAR HERNIATED DISC: ICD-10-CM

## 2024-06-13 DIAGNOSIS — M54.50 CHRONIC MIDLINE LOW BACK PAIN WITHOUT SCIATICA: ICD-10-CM

## 2024-06-13 DIAGNOSIS — N39.46 MIXED STRESS AND URGE URINARY INCONTINENCE: ICD-10-CM

## 2024-06-13 DIAGNOSIS — Z86.69 HX OF SLEEP APNEA: ICD-10-CM

## 2024-06-13 DIAGNOSIS — G89.29 CHRONIC MIDLINE LOW BACK PAIN WITHOUT SCIATICA: ICD-10-CM

## 2024-06-13 DIAGNOSIS — Z12.11 COLON CANCER SCREENING: ICD-10-CM

## 2024-06-13 DIAGNOSIS — R94.4 DECREASED GFR: ICD-10-CM

## 2024-06-13 DIAGNOSIS — R94.4 DECREASED GFR: Primary | ICD-10-CM

## 2024-06-13 LAB
ALBUMIN SERPL-MCNC: 3.8 G/DL (ref 3.4–5)
ALBUMIN/GLOB SERPL: 0.9 (ref 0.8–1.7)
ALP SERPL-CCNC: 100 U/L (ref 45–117)
ALT SERPL-CCNC: 15 U/L (ref 13–56)
ANION GAP SERPL CALC-SCNC: 7 MMOL/L (ref 3–18)
AST SERPL-CCNC: 13 U/L (ref 10–38)
BILIRUB SERPL-MCNC: 0.6 MG/DL (ref 0.2–1)
BUN SERPL-MCNC: 15 MG/DL (ref 7–18)
BUN/CREAT SERPL: 11 (ref 12–20)
CALCIUM SERPL-MCNC: 9.9 MG/DL (ref 8.5–10.1)
CHLORIDE SERPL-SCNC: 102 MMOL/L (ref 100–111)
CO2 SERPL-SCNC: 28 MMOL/L (ref 21–32)
CREAT SERPL-MCNC: 1.39 MG/DL (ref 0.6–1.3)
GLOBULIN SER CALC-MCNC: 4.4 G/DL (ref 2–4)
GLUCOSE SERPL-MCNC: 131 MG/DL (ref 74–99)
POTASSIUM SERPL-SCNC: 4.6 MMOL/L (ref 3.5–5.5)
PROT SERPL-MCNC: 8.2 G/DL (ref 6.4–8.2)
SODIUM SERPL-SCNC: 137 MMOL/L (ref 136–145)

## 2024-06-13 PROCEDURE — 36415 COLL VENOUS BLD VENIPUNCTURE: CPT

## 2024-06-13 PROCEDURE — 80053 COMPREHEN METABOLIC PANEL: CPT

## 2024-06-13 PROCEDURE — 99214 OFFICE O/P EST MOD 30 MIN: CPT | Performed by: STUDENT IN AN ORGANIZED HEALTH CARE EDUCATION/TRAINING PROGRAM

## 2024-06-13 RX ORDER — ACETAMINOPHEN 500 MG
500 TABLET ORAL 2 TIMES DAILY PRN
Qty: 90 TABLET | Refills: 2 | Status: SHIPPED | OUTPATIENT
Start: 2024-06-13

## 2024-06-13 ASSESSMENT — PATIENT HEALTH QUESTIONNAIRE - PHQ9
7. TROUBLE CONCENTRATING ON THINGS, SUCH AS READING THE NEWSPAPER OR WATCHING TELEVISION: NOT AT ALL
SUM OF ALL RESPONSES TO PHQ QUESTIONS 1-9: 0
SUM OF ALL RESPONSES TO PHQ9 QUESTIONS 1 & 2: 0
6. FEELING BAD ABOUT YOURSELF - OR THAT YOU ARE A FAILURE OR HAVE LET YOURSELF OR YOUR FAMILY DOWN: NOT AT ALL
SUM OF ALL RESPONSES TO PHQ QUESTIONS 1-9: 0
4. FEELING TIRED OR HAVING LITTLE ENERGY: NOT AT ALL
1. LITTLE INTEREST OR PLEASURE IN DOING THINGS: NOT AT ALL
10. IF YOU CHECKED OFF ANY PROBLEMS, HOW DIFFICULT HAVE THESE PROBLEMS MADE IT FOR YOU TO DO YOUR WORK, TAKE CARE OF THINGS AT HOME, OR GET ALONG WITH OTHER PEOPLE: NOT DIFFICULT AT ALL
SUM OF ALL RESPONSES TO PHQ QUESTIONS 1-9: 0
8. MOVING OR SPEAKING SO SLOWLY THAT OTHER PEOPLE COULD HAVE NOTICED. OR THE OPPOSITE, BEING SO FIGETY OR RESTLESS THAT YOU HAVE BEEN MOVING AROUND A LOT MORE THAN USUAL: NOT AT ALL
SUM OF ALL RESPONSES TO PHQ QUESTIONS 1-9: 0
3. TROUBLE FALLING OR STAYING ASLEEP: NOT AT ALL
5. POOR APPETITE OR OVEREATING: NOT AT ALL
9. THOUGHTS THAT YOU WOULD BE BETTER OFF DEAD, OR OF HURTING YOURSELF: NOT AT ALL
2. FEELING DOWN, DEPRESSED OR HOPELESS: NOT AT ALL

## 2024-06-13 NOTE — PROGRESS NOTES
Ness Stahl is a 64 y.o. year old female who presents today for   Chief Complaint   Patient presents with    Follow-up       Is someone accompanying this pt? no    Is the patient using any DME equipment during OV? no    Depression Screenin/13/2024     1:06 PM 2023    11:13 AM   PHQ-9 Questionaire   Little interest or pleasure in doing things 0 1   Feeling down, depressed, or hopeless 0 1   Trouble falling or staying asleep, or sleeping too much 0    Feeling tired or having little energy 0    Poor appetite or overeating 0    Feeling bad about yourself - or that you are a failure or have let yourself or your family down 0    Trouble concentrating on things, such as reading the newspaper or watching television 0    Moving or speaking so slowly that other people could have noticed. Or the opposite - being so fidgety or restless that you have been moving around a lot more than usual 0    Thoughts that you would be better off dead, or of hurting yourself in some way 0    PHQ-9 Total Score 0 2   If you checked off any problems, how difficult have these problems made it for you to do your work, take care of things at home, or get along with other people? 0        Abuse Screening:       No data to display                Learning Assessment:  No question data found.    Fall Risk:       No data to display                    Coordination of Care:   1. \"Have you been to the ER, urgent care clinic since your last visit?  Hospitalized since your last visit?\" no    2. \"Have you seen or consulted any other health care providers outside of the [facility] since your last visit?\" no    3. For patients aged 45-75: Has the patient had a colonoscopy / FIT/ Cologuard? no    If the patient is female:    4. For patients aged 40-74: Has the patient had a mammogram within the past 2 years? yes    5. For patients aged 21-65: Has the patient had a pap smear? no    Health Maintenance: reviewed and discussed and

## 2024-06-13 NOTE — PROGRESS NOTES
History of Present Illness  Ness Stahl is a 64 y.o. female who presents today for management of    Chief Complaint   Patient presents with    Follow-up       -pt presenting today requesting several referrals reordered, as they were originally ordered last year and pt was unable to follow up in a timely manner prior to closure of referrals  -ongoing low back pain that has become more persistent for her, would like to restart physical therapy  -longstanding hx of chronic pain including neck, back, shoulders and hips.   -previously told she has fibromyalgia.   -Previous therapy: physical therapy, opioids, gabapentin, muscle relaxants, Cymbalta, amitriptyline. She reports that only opioids were effective, but she had GI side effects from them and would like to avoid them as much as possible.       Patient Active Problem List   Diagnosis    Thyroid nodule    History of DVT (deep vein thrombosis)    Fibromyalgia    Bipolar affect, depressed ([facility])    Lumbar herniated disc      Past Medical History:   Diagnosis Date    Arthritis     Bipolar 1 disorder ([facility])     Carpal tunnel syndrome     bother arms    DVT (deep venous thrombosis) ([facility]) 2018    RUE    Fibromyalgia     GERD (gastroesophageal reflux disease)     Hypercholesterolemia     IBS (irritable bowel syndrome)     Menopause     Sleep apnea     Uses CPAP    Slipped cervical disc       Past Surgical History:   Procedure Laterality Date    CYST REMOVAL      Left wrist    HYSTERECTOMY (CERVIX STATUS UNKNOWN)      uterus prolapse    ORTHOPEDIC SURGERY      Carpal tunnel    OTHER SURGICAL HISTORY      cyst removal wrist and vagina    TUBAL LIGATION        Family History   Problem Relation Age of Onset    Alcohol Abuse Father      Social History     Socioeconomic History    Marital status: Single     Spouse name: Not on file    Number of children: Not on file    Years of education: Not on file    Highest education level: Not on file   Occupational History    Not on

## 2024-06-20 ASSESSMENT — ENCOUNTER SYMPTOMS
BACK PAIN: 1
SHORTNESS OF BREATH: 0

## 2024-06-21 DIAGNOSIS — Z13.220 SCREENING CHOLESTEROL LEVEL: ICD-10-CM

## 2024-06-21 DIAGNOSIS — Z13.1 DIABETES MELLITUS SCREENING: ICD-10-CM

## 2024-06-21 DIAGNOSIS — R94.4 DECREASED GFR: Primary | ICD-10-CM

## 2024-06-21 DIAGNOSIS — E55.9 VITAMIN D DEFICIENCY: ICD-10-CM

## 2024-06-21 DIAGNOSIS — Z78.0 POSTMENOPAUSAL: ICD-10-CM

## 2024-06-21 RX ORDER — PHENOL 1.4 %
1 AEROSOL, SPRAY (ML) MUCOUS MEMBRANE 2 TIMES DAILY
Qty: 180 TABLET | Refills: 2 | Status: SHIPPED | OUTPATIENT
Start: 2024-06-21

## 2024-06-21 RX ORDER — ACETAMINOPHEN 160 MG
1 TABLET,DISINTEGRATING ORAL DAILY
Qty: 90 CAPSULE | Refills: 2 | Status: SHIPPED | OUTPATIENT
Start: 2024-06-21

## 2024-09-09 ENCOUNTER — HOSPITAL ENCOUNTER (OUTPATIENT)
Dept: WOMENS IMAGING | Facility: HOSPITAL | Age: 64
Discharge: HOME | End: 2024-09-12
Attending: STUDENT IN AN ORGANIZED HEALTH CARE EDUCATION/TRAINING PROGRAM
Payer: COMMERCIAL

## 2024-09-09 DIAGNOSIS — Z12.31 ENCOUNTER FOR SCREENING MAMMOGRAM FOR MALIGNANT NEOPLASM OF BREAST: ICD-10-CM

## 2024-09-09 PROCEDURE — 77063 BREAST TOMOSYNTHESIS BI: CPT

## 2024-09-25 ENCOUNTER — HOSPITAL ENCOUNTER (OUTPATIENT)
Facility: HOSPITAL | Age: 64
Discharge: HOME | End: 2024-09-28
Attending: STUDENT IN AN ORGANIZED HEALTH CARE EDUCATION/TRAINING PROGRAM
Payer: COMMERCIAL

## 2024-09-25 ENCOUNTER — HOSPITAL ENCOUNTER (OUTPATIENT)
Dept: WOMENS IMAGING | Facility: HOSPITAL | Age: 64
Discharge: HOME | End: 2024-09-28
Attending: STUDENT IN AN ORGANIZED HEALTH CARE EDUCATION/TRAINING PROGRAM
Payer: COMMERCIAL

## 2024-09-25 DIAGNOSIS — R92.8 ABNORMAL MAMMOGRAM: ICD-10-CM

## 2024-09-25 PROCEDURE — G0279 TOMOSYNTHESIS, MAMMO: HCPCS

## 2024-09-25 PROCEDURE — 76642 ULTRASOUND BREAST LIMITED: CPT

## 2024-10-17 ENCOUNTER — COMMUNITY OUTREACH (OUTPATIENT)
Facility: CLINIC | Age: 64
End: 2024-10-17

## 2024-10-17 NOTE — PROGRESS NOTES
Patient's HM shows they are overdue for Colorectal Screening.   Care Everywhere and  files searched.  No results to attach to order nor HM updated.     Per [facility], no more recent colonoscopy.

## 2025-01-15 DIAGNOSIS — Z86.718 HISTORY OF DVT (DEEP VEIN THROMBOSIS): ICD-10-CM

## 2025-01-16 RX ORDER — APIXABAN 5 MG/1
5 TABLET, FILM COATED ORAL 2 TIMES DAILY
Qty: 180 TABLET | Refills: 0 | Status: SHIPPED | OUTPATIENT
Start: 2025-01-16

## 2025-01-16 NOTE — TELEPHONE ENCOUNTER
Medication(s) requesting:   Requested Prescriptions     Pending Prescriptions Disp Refills    ELIQUIS 5 MG TABS tablet [Pharmacy Med Name: Eliquis 5 MG Oral Tablet] 180 tablet 0     Sig: Take 1 tablet by mouth twice daily       Last office visit:  06/13/24  Next office visit DMA: Visit date not found

## 2025-03-25 RX ORDER — EMPAGLIFLOZIN 10 MG/1
10 TABLET, FILM COATED ORAL DAILY
Qty: 30 TABLET | Refills: 0 | Status: SHIPPED | OUTPATIENT
Start: 2025-03-25

## 2025-05-07 ENCOUNTER — HOSPITAL ENCOUNTER (OUTPATIENT)
Facility: HOSPITAL | Age: 65
Setting detail: SPECIMEN
Discharge: HOME | End: 2025-05-10
Payer: COMMERCIAL

## 2025-05-07 ENCOUNTER — OFFICE VISIT (OUTPATIENT)
Facility: CLINIC | Age: 65
End: 2025-05-07
Payer: COMMERCIAL

## 2025-05-07 DIAGNOSIS — Z13.220 SCREENING CHOLESTEROL LEVEL: ICD-10-CM

## 2025-05-07 DIAGNOSIS — Z13.1 DIABETES MELLITUS SCREENING: ICD-10-CM

## 2025-05-07 DIAGNOSIS — M54.50 CHRONIC MIDLINE LOW BACK PAIN WITHOUT SCIATICA: ICD-10-CM

## 2025-05-07 DIAGNOSIS — G89.29 CHRONIC MIDLINE LOW BACK PAIN WITHOUT SCIATICA: ICD-10-CM

## 2025-05-07 DIAGNOSIS — R53.83 FATIGUE, UNSPECIFIED TYPE: ICD-10-CM

## 2025-05-07 DIAGNOSIS — N18.31 STAGE 3A CHRONIC KIDNEY DISEASE (HCC): ICD-10-CM

## 2025-05-07 DIAGNOSIS — H69.93 CHRONIC DYSFUNCTION OF BOTH EUSTACHIAN TUBES: ICD-10-CM

## 2025-05-07 DIAGNOSIS — R94.4 DECREASED GFR: ICD-10-CM

## 2025-05-07 DIAGNOSIS — Z12.11 COLON CANCER SCREENING: ICD-10-CM

## 2025-05-07 DIAGNOSIS — M51.26 LUMBAR HERNIATED DISC: ICD-10-CM

## 2025-05-07 DIAGNOSIS — R22.1 NECK FULLNESS: Primary | ICD-10-CM

## 2025-05-07 LAB
25(OH)D3 SERPL-MCNC: 38.7 NG/ML (ref 30–100)
ALBUMIN SERPL-MCNC: 3.7 G/DL (ref 3.4–5)
ALBUMIN/GLOB SERPL: 1 (ref 0.8–1.7)
ALP SERPL-CCNC: 85 U/L (ref 45–117)
ALT SERPL-CCNC: 12 U/L (ref 10–35)
ANION GAP SERPL CALC-SCNC: 11 MMOL/L (ref 3–18)
AST SERPL-CCNC: 17 U/L (ref 10–38)
BASOPHILS # BLD: 0.04 K/UL (ref 0–0.1)
BASOPHILS NFR BLD: 0.6 % (ref 0–2)
BILIRUB SERPL-MCNC: 0.4 MG/DL (ref 0.2–1)
BUN SERPL-MCNC: 13 MG/DL (ref 6–23)
BUN/CREAT SERPL: 11 (ref 12–20)
CALCIUM SERPL-MCNC: 10.1 MG/DL (ref 8.5–10.1)
CHLORIDE SERPL-SCNC: 103 MMOL/L (ref 98–107)
CHOLEST SERPL-MCNC: 202 MG/DL
CO2 SERPL-SCNC: 26 MMOL/L (ref 21–32)
CREAT SERPL-MCNC: 1.23 MG/DL (ref 0.6–1.3)
DIFFERENTIAL METHOD BLD: ABNORMAL
EOSINOPHIL # BLD: 0.07 K/UL (ref 0–0.4)
EOSINOPHIL NFR BLD: 1 % (ref 0–5)
ERYTHROCYTE [DISTWIDTH] IN BLOOD BY AUTOMATED COUNT: 13.6 % (ref 11.6–14.5)
EST. AVERAGE GLUCOSE BLD GHB EST-MCNC: 126 MG/DL
GLOBULIN SER CALC-MCNC: 3.7 G/DL (ref 2–4)
GLUCOSE SERPL-MCNC: 110 MG/DL (ref 74–108)
HBA1C MFR BLD: 6 % (ref 4.2–5.6)
HCT VFR BLD AUTO: 45 % (ref 35–45)
HDLC SERPL-MCNC: 51 MG/DL (ref 40–60)
HDLC SERPL: 3.9 (ref 0–5)
HGB BLD-MCNC: 13.5 G/DL (ref 12–16)
IMM GRANULOCYTES # BLD AUTO: 0.01 K/UL (ref 0–0.04)
IMM GRANULOCYTES NFR BLD AUTO: 0.1 % (ref 0–0.5)
IRON SATN MFR SERPL: 26 %
IRON SERPL-MCNC: 79 UG/DL (ref 50–175)
LDLC SERPL CALC-MCNC: 124 MG/DL (ref 0–100)
LYMPHOCYTES # BLD: 3.08 K/UL (ref 0.9–3.6)
LYMPHOCYTES NFR BLD: 44.1 % (ref 21–52)
MCH RBC QN AUTO: 28.3 PG (ref 24–34)
MCHC RBC AUTO-ENTMCNC: 30 G/DL (ref 31–37)
MCV RBC AUTO: 94.3 FL (ref 78–100)
MONOCYTES # BLD: 0.6 K/UL (ref 0.05–1.2)
MONOCYTES NFR BLD: 8.6 % (ref 3–10)
NEUTS SEG # BLD: 3.19 K/UL (ref 1.8–8)
NEUTS SEG NFR BLD: 45.6 % (ref 40–73)
NRBC # BLD: 0 K/UL (ref 0–0.01)
NRBC BLD-RTO: 0 PER 100 WBC
PLATELET # BLD AUTO: 332 K/UL (ref 135–420)
PMV BLD AUTO: 10 FL (ref 9.2–11.8)
POTASSIUM SERPL-SCNC: 5.3 MMOL/L (ref 3.5–5.5)
PROT SERPL-MCNC: 7.3 G/DL (ref 6.4–8.2)
RBC # BLD AUTO: 4.77 M/UL (ref 4.2–5.3)
SODIUM SERPL-SCNC: 140 MMOL/L (ref 136–145)
T4 FREE SERPL-MCNC: 1 NG/DL (ref 0.9–1.7)
TIBC SERPL-MCNC: 306 UG/DL (ref 250–450)
TRIGL SERPL-MCNC: 133 MG/DL (ref 0–150)
TSH, 3RD GENERATION: 1.19 UIU/ML (ref 0.27–4.2)
UIBC SERPL-MCNC: 227 UG/DL (ref 112–347)
VIT B12 SERPL-MCNC: 326 PG/ML (ref 211–911)
VLDLC SERPL CALC-MCNC: 27 MG/DL
WBC # BLD AUTO: 7 K/UL (ref 4.6–13.2)

## 2025-05-07 PROCEDURE — 83036 HEMOGLOBIN GLYCOSYLATED A1C: CPT

## 2025-05-07 PROCEDURE — 80061 LIPID PANEL: CPT

## 2025-05-07 PROCEDURE — 99214 OFFICE O/P EST MOD 30 MIN: CPT | Performed by: STUDENT IN AN ORGANIZED HEALTH CARE EDUCATION/TRAINING PROGRAM

## 2025-05-07 PROCEDURE — 82306 VITAMIN D 25 HYDROXY: CPT

## 2025-05-07 PROCEDURE — 82607 VITAMIN B-12: CPT

## 2025-05-07 PROCEDURE — 85025 COMPLETE CBC W/AUTO DIFF WBC: CPT

## 2025-05-07 PROCEDURE — 84443 ASSAY THYROID STIM HORMONE: CPT

## 2025-05-07 PROCEDURE — 83540 ASSAY OF IRON: CPT

## 2025-05-07 PROCEDURE — 80053 COMPREHEN METABOLIC PANEL: CPT

## 2025-05-07 PROCEDURE — 36415 COLL VENOUS BLD VENIPUNCTURE: CPT

## 2025-05-07 PROCEDURE — 84439 ASSAY OF FREE THYROXINE: CPT

## 2025-05-07 PROCEDURE — 83550 IRON BINDING TEST: CPT

## 2025-05-07 RX ORDER — FLUTICASONE PROPIONATE 50 MCG
1 SPRAY, SUSPENSION (ML) NASAL DAILY
Qty: 32 G | Refills: 1 | Status: SHIPPED | OUTPATIENT
Start: 2025-05-07

## 2025-05-07 SDOH — ECONOMIC STABILITY: FOOD INSECURITY: WITHIN THE PAST 12 MONTHS, YOU WORRIED THAT YOUR FOOD WOULD RUN OUT BEFORE YOU GOT MONEY TO BUY MORE.: NEVER TRUE

## 2025-05-07 SDOH — ECONOMIC STABILITY: FOOD INSECURITY: WITHIN THE PAST 12 MONTHS, THE FOOD YOU BOUGHT JUST DIDN'T LAST AND YOU DIDN'T HAVE MONEY TO GET MORE.: NEVER TRUE

## 2025-05-07 ASSESSMENT — PATIENT HEALTH QUESTIONNAIRE - PHQ9
10. IF YOU CHECKED OFF ANY PROBLEMS, HOW DIFFICULT HAVE THESE PROBLEMS MADE IT FOR YOU TO DO YOUR WORK, TAKE CARE OF THINGS AT HOME, OR GET ALONG WITH OTHER PEOPLE: NOT DIFFICULT AT ALL
4. FEELING TIRED OR HAVING LITTLE ENERGY: NOT AT ALL
7. TROUBLE CONCENTRATING ON THINGS, SUCH AS READING THE NEWSPAPER OR WATCHING TELEVISION: NOT AT ALL
6. FEELING BAD ABOUT YOURSELF - OR THAT YOU ARE A FAILURE OR HAVE LET YOURSELF OR YOUR FAMILY DOWN: NOT AT ALL
SUM OF ALL RESPONSES TO PHQ QUESTIONS 1-9: 0
SUM OF ALL RESPONSES TO PHQ QUESTIONS 1-9: 0
2. FEELING DOWN, DEPRESSED OR HOPELESS: NOT AT ALL
SUM OF ALL RESPONSES TO PHQ QUESTIONS 1-9: 0
8. MOVING OR SPEAKING SO SLOWLY THAT OTHER PEOPLE COULD HAVE NOTICED. OR THE OPPOSITE, BEING SO FIGETY OR RESTLESS THAT YOU HAVE BEEN MOVING AROUND A LOT MORE THAN USUAL: NOT AT ALL
1. LITTLE INTEREST OR PLEASURE IN DOING THINGS: NOT AT ALL
SUM OF ALL RESPONSES TO PHQ QUESTIONS 1-9: 0
5. POOR APPETITE OR OVEREATING: NOT AT ALL
9. THOUGHTS THAT YOU WOULD BE BETTER OFF DEAD, OR OF HURTING YOURSELF: NOT AT ALL
3. TROUBLE FALLING OR STAYING ASLEEP: NOT AT ALL

## 2025-05-07 NOTE — PROGRESS NOTES
Ness Stahl is a 64 y.o. year old female who presents today for   Chief Complaint   Patient presents with    Medication Refill       \"Have you been to the ER, urgent care clinic since your last visit?  Hospitalized since your last visit?\"    no    “Have you seen or consulted any other health care providers outside our system since your last visit?”    no      “Have you had a colorectal cancer screening such as a colonoscopy/FIT/Cologuard?    NO    Date of last Colonoscopy: 9/3/2013  No cologuard on file  No FIT/FOBT on file   No flexible sigmoidoscopy on file         Click Here for Release of Records Request    - ERNESTINA Zarate  [facility]  Phone: [phone]  Fax: [phone]  
face to face with the patient discussing the diagnosis and importance of compliance with the treatment plan as well as documenting on the day of the visit.    I have discussed the diagnosis with the patient and the intended plan as seen in the above orders.  The patient has received an after-visit summary and questions were answered concerning future plans.  I have discussed medication side effects and warnings with the patient as well. I have reviewed the plan of care with the patient, accepted their input and they are in agreement with the treatment goals.     Follow-up and Dispositions    Return in about 4 weeks (around 6/4/2025).       Abimbola Mckeon MD   May 8, 2025

## 2025-05-08 VITALS
DIASTOLIC BLOOD PRESSURE: 71 MMHG | OXYGEN SATURATION: 95 % | WEIGHT: 241 LBS | HEIGHT: 68 IN | TEMPERATURE: 97.9 F | SYSTOLIC BLOOD PRESSURE: 108 MMHG | HEART RATE: 81 BPM | BODY MASS INDEX: 36.53 KG/M2 | RESPIRATION RATE: 16 BRPM

## 2025-05-08 ASSESSMENT — ENCOUNTER SYMPTOMS
BACK PAIN: 1
ABDOMINAL PAIN: 0
SHORTNESS OF BREATH: 0
TROUBLE SWALLOWING: 1

## 2025-05-15 DIAGNOSIS — Z86.718 HISTORY OF DVT (DEEP VEIN THROMBOSIS): ICD-10-CM

## 2025-05-16 ENCOUNTER — HOSPITAL ENCOUNTER (OUTPATIENT)
Facility: HOSPITAL | Age: 65
Discharge: HOME | End: 2025-05-19
Attending: STUDENT IN AN ORGANIZED HEALTH CARE EDUCATION/TRAINING PROGRAM
Payer: COMMERCIAL

## 2025-05-16 DIAGNOSIS — R22.1 NECK FULLNESS: ICD-10-CM

## 2025-05-16 PROCEDURE — 76536 US EXAM OF HEAD AND NECK: CPT

## 2025-05-16 RX ORDER — EMPAGLIFLOZIN 10 MG/1
10 TABLET, FILM COATED ORAL DAILY
Qty: 90 TABLET | Refills: 2 | Status: SHIPPED | OUTPATIENT
Start: 2025-05-16

## 2025-05-16 RX ORDER — APIXABAN 5 MG/1
5 TABLET, FILM COATED ORAL 2 TIMES DAILY
Qty: 180 TABLET | Refills: 2 | Status: SHIPPED | OUTPATIENT
Start: 2025-05-16

## 2025-05-20 ENCOUNTER — RESULTS FOLLOW-UP (OUTPATIENT)
Facility: CLINIC | Age: 65
End: 2025-05-20

## 2025-05-26 LAB — NONINV COLON CA DNA+OCC BLD SCRN STL QL: NEGATIVE

## 2025-06-03 ENCOUNTER — HOSPITAL ENCOUNTER (OUTPATIENT)
Facility: HOSPITAL | Age: 65
Setting detail: RECURRING SERIES
Discharge: HOME | End: 2025-06-06
Attending: STUDENT IN AN ORGANIZED HEALTH CARE EDUCATION/TRAINING PROGRAM
Payer: COMMERCIAL

## 2025-06-03 PROCEDURE — 97161 PT EVAL LOW COMPLEX 20 MIN: CPT

## 2025-06-03 NOTE — PROGRESS NOTES
PHYSICAL / OCCUPATIONAL THERAPY - DAILY TREATMENT NOTE    Patient Name: Ness Stahl    Date: 6/3/2025    : 1960  Insurance: Payor: [employer] [facility] / Plan: [employer] [facility] [facility] [place] / Product Type: *No Product type* /      Patient  verified Yes     Visit #   Current / Total 1 16   Time   In / Out 1:03 1:43   Pain   In / Out 7 7   Subjective Functional Status/Changes: See POC     TREATMENT AREA =  Chronic midline low back pain without sciatica  Lumbar herniated disc    OBJECTIVE    19 min [x]Eval        X untimed      Therapeutic Procedures:    Tx Min Billable or 1:1 Min (if diff from Tx Min) Procedure, Rationale, Specifics   8 0 [id number] Therapeutic Exercise (timed):  increase ROM, strength, coordination, balance, and proprioception to improve patient's ability to progress to PLOF and address remaining functional goals. (see flow sheet as applicable)     Details if applicable:  HEP instruction/demonstration     5 0 65155 Manual Therapy (timed):  decrease pain, increase ROM, increase tissue extensibility, and increase postural awareness to improve patient's ability to progress to PLOF and address remaining functional goals.  The manual therapy interventions were performed at a separate and distinct time from the therapeutic activities interventions . (see flow sheet as applicable)     Details if applicable:  In supine: MET to correct right anterior/left posterior innominates; in B s/l: MET to correct sacral torsion (all with consent for hand placement).    8 0 31650 Self Care/Home Management (timed):  improve patient knowledge and understanding of home injury/symptom/pain management, positioning, posture/ergonomics, home safety, activity modification, transfer techniques, and joint protection strategies  to improve patient's ability to progress to PLOF and address remaining functional goals.  (see flow sheet as applicable)     Details if applicable:  Pt education on relevant anatomy/physiology,
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
## 2025-06-16 ENCOUNTER — OFFICE VISIT (OUTPATIENT)
Facility: CLINIC | Age: 65
End: 2025-06-16
Payer: COMMERCIAL

## 2025-06-16 VITALS
BODY MASS INDEX: 35.64 KG/M2 | TEMPERATURE: 97 F | OXYGEN SATURATION: 96 % | DIASTOLIC BLOOD PRESSURE: 69 MMHG | WEIGHT: 235.2 LBS | HEIGHT: 68 IN | RESPIRATION RATE: 13 BRPM | HEART RATE: 88 BPM | SYSTOLIC BLOOD PRESSURE: 102 MMHG

## 2025-06-16 DIAGNOSIS — E04.1 THYROID NODULE: ICD-10-CM

## 2025-06-16 DIAGNOSIS — E78.5 HYPERLIPIDEMIA, UNSPECIFIED HYPERLIPIDEMIA TYPE: ICD-10-CM

## 2025-06-16 DIAGNOSIS — H93.8X3 EAR FULLNESS, BILATERAL: ICD-10-CM

## 2025-06-16 DIAGNOSIS — N18.31 STAGE 3A CHRONIC KIDNEY DISEASE (HCC): Primary | ICD-10-CM

## 2025-06-16 DIAGNOSIS — M54.50 CHRONIC MIDLINE LOW BACK PAIN WITHOUT SCIATICA: ICD-10-CM

## 2025-06-16 DIAGNOSIS — G47.33 OSA (OBSTRUCTIVE SLEEP APNEA): ICD-10-CM

## 2025-06-16 DIAGNOSIS — R42 VERTIGO: ICD-10-CM

## 2025-06-16 DIAGNOSIS — G89.29 CHRONIC MIDLINE LOW BACK PAIN WITHOUT SCIATICA: ICD-10-CM

## 2025-06-16 DIAGNOSIS — R73.03 PREDIABETES: ICD-10-CM

## 2025-06-16 PROCEDURE — 99214 OFFICE O/P EST MOD 30 MIN: CPT | Performed by: STUDENT IN AN ORGANIZED HEALTH CARE EDUCATION/TRAINING PROGRAM

## 2025-06-16 SDOH — ECONOMIC STABILITY: FOOD INSECURITY: WITHIN THE PAST 12 MONTHS, THE FOOD YOU BOUGHT JUST DIDN'T LAST AND YOU DIDN'T HAVE MONEY TO GET MORE.: NEVER TRUE

## 2025-06-16 SDOH — ECONOMIC STABILITY: FOOD INSECURITY: WITHIN THE PAST 12 MONTHS, YOU WORRIED THAT YOUR FOOD WOULD RUN OUT BEFORE YOU GOT MONEY TO BUY MORE.: NEVER TRUE

## 2025-06-16 ASSESSMENT — PATIENT HEALTH QUESTIONNAIRE - PHQ9
6. FEELING BAD ABOUT YOURSELF - OR THAT YOU ARE A FAILURE OR HAVE LET YOURSELF OR YOUR FAMILY DOWN: NOT AT ALL
5. POOR APPETITE OR OVEREATING: NOT AT ALL
SUM OF ALL RESPONSES TO PHQ QUESTIONS 1-9: 0
2. FEELING DOWN, DEPRESSED OR HOPELESS: NOT AT ALL
SUM OF ALL RESPONSES TO PHQ QUESTIONS 1-9: 0
1. LITTLE INTEREST OR PLEASURE IN DOING THINGS: NOT AT ALL
8. MOVING OR SPEAKING SO SLOWLY THAT OTHER PEOPLE COULD HAVE NOTICED. OR THE OPPOSITE, BEING SO FIGETY OR RESTLESS THAT YOU HAVE BEEN MOVING AROUND A LOT MORE THAN USUAL: NOT AT ALL
3. TROUBLE FALLING OR STAYING ASLEEP: NOT AT ALL
SUM OF ALL RESPONSES TO PHQ QUESTIONS 1-9: 0
SUM OF ALL RESPONSES TO PHQ QUESTIONS 1-9: 0
9. THOUGHTS THAT YOU WOULD BE BETTER OFF DEAD, OR OF HURTING YOURSELF: NOT AT ALL
4. FEELING TIRED OR HAVING LITTLE ENERGY: NOT AT ALL
7. TROUBLE CONCENTRATING ON THINGS, SUCH AS READING THE NEWSPAPER OR WATCHING TELEVISION: NOT AT ALL
10. IF YOU CHECKED OFF ANY PROBLEMS, HOW DIFFICULT HAVE THESE PROBLEMS MADE IT FOR YOU TO DO YOUR WORK, TAKE CARE OF THINGS AT HOME, OR GET ALONG WITH OTHER PEOPLE: NOT DIFFICULT AT ALL

## 2025-06-16 ASSESSMENT — ENCOUNTER SYMPTOMS
ABDOMINAL PAIN: 0
BACK PAIN: 1
SHORTNESS OF BREATH: 0

## 2025-06-16 NOTE — PROGRESS NOTES
History of Present Illness  Ness Stahl is a 65 y.o. female who presents today for management of    Chief Complaint   Patient presents with    1 Month Follow-Up       - Patient overall stable since last visit  - Previously referred to physical therapy, awaiting scheduling due to insurance.  Patient states that she has been more active and has been using stretching exercises to help with her back pain  - Reports compliance to Jardiance, started for CKD  - Reports continued vertigo/dizziness symptoms, not improved with antihistamine and Flonase use  - Requesting referral to sleep medicine specialist.  Patient diagnosed with sleep apnea many years ago.  Reports discontinuing CPAP use after losing weight.    - Since her weight has increased, she would like to get reevaluated to see if she still has sleep apnea.      Patient Active Problem List   Diagnosis    Thyroid nodule    History of DVT (deep vein thrombosis)    Fibromyalgia    Bipolar affect, depressed ([facility])    Lumbar herniated disc      Past Medical History:   Diagnosis Date    Arthritis     Bipolar 1 disorder ([facility])     Carpal tunnel syndrome     bother arms    DVT (deep venous thrombosis) ([facility]) 2018    RUE    Fibromyalgia     GERD (gastroesophageal reflux disease)     Hypercholesterolemia     IBS (irritable bowel syndrome)     Menopause     Sleep apnea     Uses CPAP    Slipped cervical disc       Past Surgical History:   Procedure Laterality Date    CYST REMOVAL      Left wrist    HYSTERECTOMY (CERVIX STATUS UNKNOWN)      uterus prolapse    ORTHOPEDIC SURGERY      Carpal tunnel    OTHER SURGICAL HISTORY      cyst removal wrist and vagina    TUBAL LIGATION        Family History   Problem Relation Age of Onset    Alcohol Abuse Father      Social History     Socioeconomic History    Marital status: Single     Spouse name: Not on file    Number of children: Not on file    Years of education: Not on file    Highest education level: Not on file   Occupational

## 2025-06-16 NOTE — PROGRESS NOTES
Ness Stahl is a 65 y.o. year old female who presents today for   Chief Complaint   Patient presents with    1 Month Follow-Up        \"Have you been to the ER, urgent care clinic since your last visit?  Hospitalized since your last visit?\"   NO     “Have you seen or consulted any other health care providers outside our system since your last visit?”   NO       “Have you had a colorectal cancer screening such as a colonoscopy/FIT/Cologuard?    NO    Date of last Colonoscopy: 9/3/2013  Date of last Cologuard: 5/20/2025  No FIT/FOBT on file   No flexible sigmoidoscopy on file           - Bouchra Harrington/PERFECTO Eugene  [facility]  Phone: [phone]  Fax: [phone]

## 2025-06-25 ENCOUNTER — APPOINTMENT (OUTPATIENT)
Facility: HOSPITAL | Age: 65
End: 2025-06-25
Attending: STUDENT IN AN ORGANIZED HEALTH CARE EDUCATION/TRAINING PROGRAM
Payer: COMMERCIAL

## 2025-07-15 ENCOUNTER — OFFICE VISIT (OUTPATIENT)
Age: 65
End: 2025-07-15

## 2025-07-15 VITALS — HEIGHT: 68 IN | WEIGHT: 235 LBS | BODY MASS INDEX: 35.61 KG/M2

## 2025-07-15 DIAGNOSIS — M25.551 BILATERAL HIP PAIN: ICD-10-CM

## 2025-07-15 DIAGNOSIS — M70.62 TROCHANTERIC BURSITIS OF LEFT HIP: ICD-10-CM

## 2025-07-15 DIAGNOSIS — M54.50 LOW BACK PAIN, UNSPECIFIED BACK PAIN LATERALITY, UNSPECIFIED CHRONICITY, UNSPECIFIED WHETHER SCIATICA PRESENT: Primary | ICD-10-CM

## 2025-07-15 DIAGNOSIS — M54.16 RADICULOPATHY, LUMBAR REGION: ICD-10-CM

## 2025-07-15 DIAGNOSIS — M47.816 LUMBAR FACET ARTHROPATHY: ICD-10-CM

## 2025-07-15 DIAGNOSIS — M25.69 DECREASED RANGE OF MOTION OF TRUNK AND BACK: ICD-10-CM

## 2025-07-15 DIAGNOSIS — M51.360 DEGENERATION OF INTERVERTEBRAL DISC OF LUMBAR REGION WITH DISCOGENIC BACK PAIN: ICD-10-CM

## 2025-07-15 DIAGNOSIS — M76.32 IT BAND SYNDROME, LEFT: ICD-10-CM

## 2025-07-15 DIAGNOSIS — M43.16 SPONDYLOLISTHESIS OF LUMBAR REGION: ICD-10-CM

## 2025-07-15 DIAGNOSIS — M25.552 BILATERAL HIP PAIN: ICD-10-CM

## 2025-07-15 DIAGNOSIS — M79.18 MYOFASCIAL PAIN SYNDROME OF LUMBAR SPINE: ICD-10-CM

## 2025-07-15 RX ORDER — TRIAMCINOLONE ACETONIDE 40 MG/ML
80 INJECTION, SUSPENSION INTRA-ARTICULAR; INTRAMUSCULAR ONCE
Status: COMPLETED | OUTPATIENT
Start: 2025-07-15 | End: 2025-07-15

## 2025-07-15 RX ORDER — BUPIVACAINE HYDROCHLORIDE 5 MG/ML
7 INJECTION, SOLUTION PERINEURAL ONCE
Status: COMPLETED | OUTPATIENT
Start: 2025-07-15 | End: 2025-07-15

## 2025-07-15 RX ADMIN — TRIAMCINOLONE ACETONIDE 80 MG: 40 INJECTION, SUSPENSION INTRA-ARTICULAR; INTRAMUSCULAR at 11:49

## 2025-07-15 RX ADMIN — BUPIVACAINE HYDROCHLORIDE 35 MG: 5 INJECTION, SOLUTION PERINEURAL at 11:48

## 2025-07-16 NOTE — PROGRESS NOTES
Housing Stability: Low Risk  (6/16/2025)    Housing Stability Vital Sign     Unable to Pay for Housing in the Last Year: No     Number of Times Moved in the Last Year: 0     Homeless in the Last Year: No       ROS:  No CP, No SOB, No fever/chills nor night sweats. No headaches, vision abnormalities to include double and or loss of vision. No dizziness. No hearing abnormalities.   Pt denies h/o spinal surgery, injections, or PT/chiropractor. Patient has attempted self treatment with less than adequate relief on oral and topical analgesic / anti inflammatory medications . Pt denies change in bowel or bladder habits. No saddle paresthesia / anesthesia. Pt denies fever, unplanned weight loss / weight gains, and no skin changes.    Musculoskeletal pain per HPI.  Pain is exacerbated positionally.     PHYSICAL EXAM:    Ht 1.727 m (5' 8\")   Wt 106.6 kg (235 lb)   LMP  (LMP Unknown)   BMI 35.73 kg/m²     Constitutional: Appears well-developed and well-nourished. No distress. Sitting comfortably in the exam room, interacting with conversation with pleasant affect.  Gait appears steady and patient exhibits no evidence of ataxia. Patient is able to ambulate without caution. No focal neurological deficit noted. No facial droop, slurred speech, or evidence of altered mentation noted on exam.   Skin: Skin over the head, neck, bilateral limbs, and trunk is warm and dry. No rash or erythema noted.   Cranial nerves:      2:  Visual fields are full.  Visual Acuity is good for reading and TV viewing. Pupils are equal and pupillary responses are normal.   3, 4, 6:  Extraocular muscles are intact.    5:  Sensation and jaw opening are normal.   7:  Facial strength is intact.     8:  Hearing is intact.   9:  Palate elevates symmetrically.  10:  speech scanning.  11:  Sternocleidomastoid and trapezius strength intact.  12:  Tongue protrudes on the midline.  There are no fasciculations of the tongue.     HENT: NC/AT. Normal symmetry,

## 2025-07-19 DIAGNOSIS — Z78.0 POSTMENOPAUSAL: ICD-10-CM

## 2025-07-21 NOTE — TELEPHONE ENCOUNTER
Medication(s) requesting:   Requested Prescriptions     Pending Prescriptions Disp Refills    calcium carbonate 1500 (600 Ca) MG TABS tablet [Pharmacy Med Name: Calcium Carbonate 600 MG Oral Tablet] 60 tablet 0     Sig: TAKE 1 TABLET BY MOUTH ONCE DAILY IN THE MORNING AND 1 ONCE DAILY AT BEDTIME    SV VITAMIN D3 50 MCG (2000 UT) CAPS capsule [Pharmacy Med Name: VITAMIN D3 (RAHEEM) 50MCG  CAP] 30 capsule 0     Sig: Take 1 capsule by mouth once daily       Last office visit:  06/16/25  Next office visit DMA: 8/25/2025

## 2025-07-23 ENCOUNTER — TELEPHONE (OUTPATIENT)
Age: 65
End: 2025-07-23

## 2025-07-23 NOTE — TELEPHONE ENCOUNTER
[facility] Auth Dept called to inform [facility] that the patients' MRI has been denied by insurance due to lack of 6 weeks of provider guided treatment. There is no peer to peer option, only appeal at this point.    To Appeal call [phone]  Service # [id number]  Due by 9/21/2025

## 2025-07-31 ENCOUNTER — HOSPITAL ENCOUNTER (OUTPATIENT)
Facility: HOSPITAL | Age: 65
Setting detail: RECURRING SERIES
End: 2025-07-31
Attending: STUDENT IN AN ORGANIZED HEALTH CARE EDUCATION/TRAINING PROGRAM
Payer: COMMERCIAL

## 2025-07-31 PROCEDURE — 97161 PT EVAL LOW COMPLEX 20 MIN: CPT

## 2025-07-31 NOTE — PROGRESS NOTES
PHYSICAL / OCCUPATIONAL THERAPY - DAILY TREATMENT NOTE (updated )    Patient Name: Ness Stahl    Date: 2025    : 1960  Insurance: Payor: [employer] [facility] [place] / Plan: [employer] [facility] [place] / Product Type: *No Product type* /      Patient  verified Yes     Visit #   Current / Total 1 4   Time   In / Out 1:44 2:22   Pain   In / Out 4 4   Subjective Functional Status/Changes: SEE BELOW     TREATMENT AREA =  It band syndrome, left  Trochanteric bursitis of left hip    SUBJECTIVE  Current symptoms/Complaints:  Pt is a 66yo female with some L hip pain. She thought symptoms were from an accident a while ago but then ABRAM gave dx of some bursitis with a cortisone injection which helped to alleviate some pain.   -started 1 year ago   No treatments at that time prior to injection  Pain was in the posterior hip and radiating into the foot.     Mechanism of Injury: unsure   Pain Level (0-10 scale):C: 4, B: 0, W: 4  []constant []intermittent []improving []worsening []no change since onset  Worsens: movement around the house involving bending, sleeping in bed on either side, bending, sweeping, vacuuming ; squatting/bending for house care , sitting > 45-60 min prior to pain   Eases: supine , sitting and resting   PLOF: active   Limitations to PLOF: standing for meal prep; lifting leg for shower/car/bed t/f; bed mobility   Previous Treatment/Compliance: injection  PMHx/Surgical Hx: fibromyalgia - managed over the years with medications   Work Hx: not working  Social: crafting ; started walking (could have triggered some lateral hip pain)  Living Situation: single story; steps to enter porch (pain with stepping up) ; lives alone   Pt Goals: \"get better; do my walking 4 blocks up and back, 3 days/week\"  Barriers: []pain []financial []time []transportation []other  Motivation: good   Substance use: none     OBJECTIVE    Modalities Rationale:     decrease edema, decrease inflammation, and decrease

## 2025-07-31 NOTE — PROGRESS NOTES
[facility]  [address] [place] [id number] Phone: [phone] Fax   Plan of Care / Statement of Necessity for Physical Therapy Services     Patient Name: Ness Stahl : 1960   Medical   Diagnosis: It band syndrome, left  Trochanteric bursitis of left hip Treatment Diagnosis: M25.552  LEFT HIP PAIN and M79.652  Pain in left thigh  and M54.59  OTHER LOWER BACK PAIN      Onset Date: 1 year ago  Payor :  Payor: [facility] / Plan: [employer] BETTER [facility] / Product Type: *No Product type* /    Referral Source: Yohan Rodriguez PA-C Start of Care (SOC): 2025   Prior Hospitalization: See medical history Provider #: [id number]   Prior Level of Function: Independent    Comorbidities: FMS; vestibular dysfunction (?); Social determinants of health: none   Medications: Verified on Patient Summary List     Assessment / key information:  Pt is a 66 yo female with 1 year hx of some LBP as well as L lateral hip pain. She recently received cortisone injection which has greatly alleviated pain to 4/10 worst. Her main c/o are SL positions for sleep, stair climbing, prolonged household chores yani involving bending, squatting, lifting. She has weakness in the core, hips, decreased flexibility through hip flexors, HS , hip rotators, calves. She has functional balance but does note fear of falling due to new onset of R hearing/ear dysfunction (notes ENT exam negative). Gross PT vestibular testing today also (-).  Pt will benefit from skilled PT interventions to address the aforementioned deficits, restore function and allow pt to return to PLOF.      Evaluation Complexity:  History:  MEDIUM  Complexity : 1-2 comorbidities / personal factors will impact the outcome/ POC ; Examination:  HIGH Complexity : 4+ Standardized tests and measures addressing body structure, function, activity limitation and / or participation in recreation

## 2025-08-22 ENCOUNTER — HOSPITAL ENCOUNTER (OUTPATIENT)
Facility: HOSPITAL | Age: 65
Setting detail: RECURRING SERIES
Discharge: HOME | End: 2025-08-25
Attending: STUDENT IN AN ORGANIZED HEALTH CARE EDUCATION/TRAINING PROGRAM
Payer: COMMERCIAL

## 2025-08-22 PROCEDURE — 97530 THERAPEUTIC ACTIVITIES: CPT

## 2025-08-22 PROCEDURE — 97535 SELF CARE MNGMENT TRAINING: CPT

## 2025-08-25 ENCOUNTER — TELEPHONE (OUTPATIENT)
Facility: CLINIC | Age: 65
End: 2025-08-25

## 2025-08-25 ENCOUNTER — TELEMEDICINE (OUTPATIENT)
Facility: CLINIC | Age: 65
End: 2025-08-25
Payer: COMMERCIAL

## 2025-08-25 DIAGNOSIS — R73.03 PREDIABETES: Primary | ICD-10-CM

## 2025-08-25 DIAGNOSIS — Z78.0 POSTMENOPAUSAL: ICD-10-CM

## 2025-08-25 DIAGNOSIS — R42 VERTIGO: ICD-10-CM

## 2025-08-25 DIAGNOSIS — N18.31 STAGE 3A CHRONIC KIDNEY DISEASE (HCC): ICD-10-CM

## 2025-08-25 DIAGNOSIS — H93.8X3 EAR FULLNESS, BILATERAL: ICD-10-CM

## 2025-08-25 PROCEDURE — 99214 OFFICE O/P EST MOD 30 MIN: CPT | Performed by: STUDENT IN AN ORGANIZED HEALTH CARE EDUCATION/TRAINING PROGRAM

## 2025-09-03 ASSESSMENT — ENCOUNTER SYMPTOMS: SHORTNESS OF BREATH: 0

## (undated) DEVICE — SYR 50ML SLIP TIP NSAF LF STRL --

## (undated) DEVICE — KIT COLON W/ 1.1OZ LUB AND 2 END

## (undated) DEVICE — BITE BLK ENDOSCP AD 54FR GRN POLYETH ENDOSCP W STRP SLD

## (undated) DEVICE — BASIN EMESIS 500CC ROSE 250/CS 60/PLT: Brand: MEDEGEN MEDICAL PRODUCTS, LLC

## (undated) DEVICE — MEDI-VAC NON-CONDUCTIVE SUCTION TUBING: Brand: CARDINAL HEALTH

## (undated) DEVICE — FLEX ADVANTAGE 1500CC: Brand: FLEX ADVANTAGE

## (undated) DEVICE — KENDALL 500 SERIES DIAPHORETIC FOAM MONITORING ELECTRODE - TEAR DROP SHAPE ( 30/PK): Brand: KENDALL